# Patient Record
Sex: FEMALE | Race: WHITE | NOT HISPANIC OR LATINO | Employment: FULL TIME | URBAN - NONMETROPOLITAN AREA
[De-identification: names, ages, dates, MRNs, and addresses within clinical notes are randomized per-mention and may not be internally consistent; named-entity substitution may affect disease eponyms.]

---

## 2017-01-13 ENCOUNTER — TRANSCRIBE ORDERS (OUTPATIENT)
Dept: INFUSION THERAPY | Facility: HOSPITAL | Age: 32
End: 2017-01-13

## 2017-01-13 DIAGNOSIS — D50.9 IRON DEFICIENCY ANEMIA, UNSPECIFIED: ICD-10-CM

## 2017-01-13 DIAGNOSIS — K90.9 UNSPECIFIED INTESTINAL MALABSORPTION: Primary | ICD-10-CM

## 2017-01-16 ENCOUNTER — HOSPITAL ENCOUNTER (OUTPATIENT)
Dept: INFUSION THERAPY | Facility: HOSPITAL | Age: 32
Setting detail: INFUSION SERIES
Discharge: HOME | End: 2017-01-16
Attending: INTERNAL MEDICINE

## 2017-01-16 VITALS
HEART RATE: 81 BPM | SYSTOLIC BLOOD PRESSURE: 107 MMHG | DIASTOLIC BLOOD PRESSURE: 66 MMHG | RESPIRATION RATE: 18 BRPM | TEMPERATURE: 97.8 F

## 2017-01-16 DIAGNOSIS — K90.9 UNSPECIFIED INTESTINAL MALABSORPTION: ICD-10-CM

## 2017-01-16 DIAGNOSIS — D50.9 IRON DEFICIENCY ANEMIA, UNSPECIFIED: ICD-10-CM

## 2017-01-16 PROCEDURE — 25010000002 FERUMOXYTOL 510 MG/17ML SOLUTION 510 MG VIAL: Performed by: INTERNAL MEDICINE

## 2017-01-16 PROCEDURE — 96374 THER/PROPH/DIAG INJ IV PUSH: CPT

## 2017-01-16 RX ORDER — DICYCLOMINE HYDROCHLORIDE 10 MG/1
10 CAPSULE ORAL AS NEEDED
COMMUNITY

## 2017-01-16 RX ORDER — FLUTICASONE PROPIONATE 50 MCG
2 SPRAY, SUSPENSION (ML) NASAL DAILY
COMMUNITY

## 2017-01-16 RX ORDER — UBIDECARENONE 75 MG
250 CAPSULE ORAL DAILY
COMMUNITY

## 2017-01-16 RX ADMIN — FERUMOXYTOL 510 MG: 510 INJECTION INTRAVENOUS at 14:32

## 2017-01-16 NOTE — MR AVS SNAPSHOT
[facility] OUTPATIENT INFUSION NON ONCOLOGY  [phone]                    Gabrielle Lopez   1/16/2017  2:00 PM   INFUSION    Provider:  BED 3 [facility]   Department:  [facility] OUTPATIENT INFUSION NON ONCOLOGY   Dept Phone:  [phone]                Your Full Care Plan           Instructions    Ferumoxytol injection  What is this medicine?  FERUMOXYTOL is an iron complex. Iron is used to make healthy red blood cells, which carry oxygen and nutrients throughout the body. This medicine is used to treat iron deficiency anemia in people with chronic kidney disease.  This medicine may be used for other purposes; ask your health care provider or pharmacist if you have questions.  What should I tell my health care provider before I take this medicine?  They need to know if you have any of these conditions:  -anemia not caused by low iron levels  -high levels of iron in the blood  -magnetic resonance imaging (MRI) test scheduled  -an unusual or allergic reaction to iron, other medicines, foods, dyes, or preservatives  -pregnant or trying to get pregnant  -breast-feeding  How should I use this medicine?  This medicine is for injection into a vein. It is given by a health care professional in a hospital or clinic setting.  Talk to your pediatrician regarding the use of this medicine in children. Special care may be needed.  Overdosage: If you think you have taken too much of this medicine contact a poison control center or emergency room at once.  NOTE: This medicine is only for you. Do not share this medicine with others.  What if I miss a dose?  It is important not to miss your dose. Call your doctor or health care professional if you are unable to keep an appointment.  What may interact with this medicine?  This medicine may interact with the following medications:  -other iron products  This list may not describe all possible interactions. Give your health care provider a list of all the  medicines, herbs, non-prescription drugs, or dietary supplements you use. Also tell them if you smoke, drink alcohol, or use illegal drugs. Some items may interact with your medicine.  What should I watch for while using this medicine?  Visit your doctor or healthcare professional regularly. Tell your doctor or healthcare professional if your symptoms do not start to get better or if they get worse. You may need blood work done while you are taking this medicine.  You may need to follow a special diet. Talk to your doctor. Foods that contain iron include: whole grains/cereals, dried fruits, beans, or peas, leafy green vegetables, and organ meats (liver, kidney).  What side effects may I notice from receiving this medicine?  Side effects that you should report to your doctor or health care professional as soon as possible:  -allergic reactions like skin rash, itching or hives, swelling of the face, lips, or tongue  -breathing problems  -changes in blood pressure  -feeling faint or lightheaded, falls  -fever or chills  -flushing, sweating, or hot feelings  -swelling of the ankles or feet  Side effects that usually do not require medical attention (Report these to your doctor or health care professional if they continue or are bothersome.):  -diarrhea  -headache  -nausea, vomiting  -stomach pain  This list may not describe all possible side effects. Call your doctor for medical advice about side effects. You may report side effects to FDA at 3-979-FDA-1154.  Where should I keep my medicine?  This drug is given in a hospital or clinic and will not be stored at home.  NOTE: This sheet is a summary. It may not cover all possible information. If you have questions about this medicine, talk to your doctor, pharmacist, or health care provider.     © 2016, Elsevier/Gold Standard. (2013-08-02 15:23:36)         To Do List     1/23/2017 5:00 PM     Appointment with CHAIR 3 COR ACU at [facility] OUTPATIENT INFUSION NON  ONCOLOGY ([phone])   [address] [id number]            Your Updated Medication List      ASK your doctor about these medications     dicyclomine 10 MG capsule   Commonly known as:  BENTYL       fluticasone 50 MCG/ACT nasal spray   Commonly known as:  FLONASE       linaclotide 290 MCG capsule capsule   Commonly known as:  LINZESS       vitamin B-12 100 MCG tablet   Commonly known as:  CYANOCOBALAMIN               [employer] Signup     [facility] [employer] allows you to send messages to your doctor, view your test results, renew your prescriptions, schedule appointments, and more. To sign up, go to [employer] and click on the Sign Up Now link in the New User? box. Enter your [employer] Activation Code exactly as it appears below along with the last four digits of your Social Security Number and your Date of Birth () to complete the sign-up process. If you do not sign up before the expiration date, you must request a new code.    [employer] Activation Code: [email or web address]  Expires: 2017  3:26 PM    If you have questions, you can email [email or web address] or call [phone] to talk to our [employer] staff. Remember, [employer] is NOT to be used for urgent needs. For medical emergencies, dial 911.               Other Info from Your Visit           Allergies     No Known Allergies      Reason for Visit     IV Medication           Vital Signs     Blood Pressure Pulse Temperature Respirations Smoking Status       107/66 (BP Location: Left arm, Patient Position: Sitting) 81 97.8 °F (36.6 °C) (Oral) 18 Never Smoker       Medications Administered     ferumoxytol (FERAHEME) 510 mg in sodium chloride 0.9 % 117 mL IVPB                    Discharge Instructions     None         SYMPTOMS OF A STROKE    Call 911 or have someone take you to the Emergency Department if you have any of the following:    · Sudden numbness or weakness of your face, arm or leg especially on one side of the  body  · Sudden confusion, diffiiculty speaking or trouble understanding   · Changes in your vision or loss of sight in one eye  · Sudden severe headache with no known cause  · sudden dizziness, trouble walking, loss of balance or coordination    It is important to seek emergency care right away if you have further stroke symptoms. If you get emergency help quickly, the powerful clot-dissolving medicines can reduce the disabilities caused by a stroke.     For more information:    American Stroke Association  6-533-4-STROKE  www.strokeassociation.org           IF YOU SMOKE OR USE TOBACCO PLEASE READ THE FOLLOWING:    Why is smoking bad for me?  Smoking increases the risk of heart disease, lung disease, vascular disease, stroke, and cancer.     If you smoke, STOP!    If you would like more information on quitting smoking, please visit the [facility] website: [email or web address]   This link will provide additional resources including the QUIT line and the Beat the Pack support groups.     For more information:    American Cancer Society  [phone]    American Heart Association  [phone]

## 2017-01-16 NOTE — PATIENT INSTRUCTIONS
Ferumoxytol injection  What is this medicine?  FERUMOXYTOL is an iron complex. Iron is used to make healthy red blood cells, which carry oxygen and nutrients throughout the body. This medicine is used to treat iron deficiency anemia in people with chronic kidney disease.  This medicine may be used for other purposes; ask your health care provider or pharmacist if you have questions.  What should I tell my health care provider before I take this medicine?  They need to know if you have any of these conditions:  -anemia not caused by low iron levels  -high levels of iron in the blood  -magnetic resonance imaging (MRI) test scheduled  -an unusual or allergic reaction to iron, other medicines, foods, dyes, or preservatives  -pregnant or trying to get pregnant  -breast-feeding  How should I use this medicine?  This medicine is for injection into a vein. It is given by a health care professional in a hospital or clinic setting.  Talk to your pediatrician regarding the use of this medicine in children. Special care may be needed.  Overdosage: If you think you have taken too much of this medicine contact a poison control center or emergency room at once.  NOTE: This medicine is only for you. Do not share this medicine with others.  What if I miss a dose?  It is important not to miss your dose. Call your doctor or health care professional if you are unable to keep an appointment.  What may interact with this medicine?  This medicine may interact with the following medications:  -other iron products  This list may not describe all possible interactions. Give your health care provider a list of all the medicines, herbs, non-prescription drugs, or dietary supplements you use. Also tell them if you smoke, drink alcohol, or use illegal drugs. Some items may interact with your medicine.  What should I watch for while using this medicine?  Visit your doctor or healthcare professional regularly. Tell your doctor or healthcare  professional if your symptoms do not start to get better or if they get worse. You may need blood work done while you are taking this medicine.  You may need to follow a special diet. Talk to your doctor. Foods that contain iron include: whole grains/cereals, dried fruits, beans, or peas, leafy green vegetables, and organ meats (liver, kidney).  What side effects may I notice from receiving this medicine?  Side effects that you should report to your doctor or health care professional as soon as possible:  -allergic reactions like skin rash, itching or hives, swelling of the face, lips, or tongue  -breathing problems  -changes in blood pressure  -feeling faint or lightheaded, falls  -fever or chills  -flushing, sweating, or hot feelings  -swelling of the ankles or feet  Side effects that usually do not require medical attention (Report these to your doctor or health care professional if they continue or are bothersome.):  -diarrhea  -headache  -nausea, vomiting  -stomach pain  This list may not describe all possible side effects. Call your doctor for medical advice about side effects. You may report side effects to FDA at 0-942-FDA-8019.  Where should I keep my medicine?  This drug is given in a hospital or clinic and will not be stored at home.  NOTE: This sheet is a summary. It may not cover all possible information. If you have questions about this medicine, talk to your doctor, pharmacist, or health care provider.     © 2016, Elsevier/Gold Standard. (2013-08-02 15:23:36)

## 2017-01-16 NOTE — IP AVS SNAPSHOT
[facility] OUTPATIENT INFUSION NON ONCOLOGY  [phone]                    Gabrielle Lopez   1/16/2017  2:00 PM   INFUSION    Provider:  BED 3 [facility]   Department:  [facility] OUTPATIENT INFUSION NON ONCOLOGY   Dept Phone:  [phone]                Your Full Care Plan           Instructions    Ferumoxytol injection  What is this medicine?  FERUMOXYTOL is an iron complex. Iron is used to make healthy red blood cells, which carry oxygen and nutrients throughout the body. This medicine is used to treat iron deficiency anemia in people with chronic kidney disease.  This medicine may be used for other purposes; ask your health care provider or pharmacist if you have questions.  What should I tell my health care provider before I take this medicine?  They need to know if you have any of these conditions:  -anemia not caused by low iron levels  -high levels of iron in the blood  -magnetic resonance imaging (MRI) test scheduled  -an unusual or allergic reaction to iron, other medicines, foods, dyes, or preservatives  -pregnant or trying to get pregnant  -breast-feeding  How should I use this medicine?  This medicine is for injection into a vein. It is given by a health care professional in a hospital or clinic setting.  Talk to your pediatrician regarding the use of this medicine in children. Special care may be needed.  Overdosage: If you think you have taken too much of this medicine contact a poison control center or emergency room at once.  NOTE: This medicine is only for you. Do not share this medicine with others.  What if I miss a dose?  It is important not to miss your dose. Call your doctor or health care professional if you are unable to keep an appointment.  What may interact with this medicine?  This medicine may interact with the following medications:  -other iron products  This list may not describe all possible interactions. Give your health care provider a list of all the  medicines, herbs, non-prescription drugs, or dietary supplements you use. Also tell them if you smoke, drink alcohol, or use illegal drugs. Some items may interact with your medicine.  What should I watch for while using this medicine?  Visit your doctor or healthcare professional regularly. Tell your doctor or healthcare professional if your symptoms do not start to get better or if they get worse. You may need blood work done while you are taking this medicine.  You may need to follow a special diet. Talk to your doctor. Foods that contain iron include: whole grains/cereals, dried fruits, beans, or peas, leafy green vegetables, and organ meats (liver, kidney).  What side effects may I notice from receiving this medicine?  Side effects that you should report to your doctor or health care professional as soon as possible:  -allergic reactions like skin rash, itching or hives, swelling of the face, lips, or tongue  -breathing problems  -changes in blood pressure  -feeling faint or lightheaded, falls  -fever or chills  -flushing, sweating, or hot feelings  -swelling of the ankles or feet  Side effects that usually do not require medical attention (Report these to your doctor or health care professional if they continue or are bothersome.):  -diarrhea  -headache  -nausea, vomiting  -stomach pain  This list may not describe all possible side effects. Call your doctor for medical advice about side effects. You may report side effects to FDA at 7-599-FDA-0357.  Where should I keep my medicine?  This drug is given in a hospital or clinic and will not be stored at home.  NOTE: This sheet is a summary. It may not cover all possible information. If you have questions about this medicine, talk to your doctor, pharmacist, or health care provider.     © 2016, Elsevier/Gold Standard. (2013-08-02 15:23:36)         To Do List     1/23/2017 5:00 PM     Appointment with CHAIR 3 COR ACU at [facility] OUTPATIENT INFUSION NON  ONCOLOGY ([phone])   [address] [phone]            Your Updated Medication List      ASK your doctor about these medications     dicyclomine 10 MG capsule   Commonly known as:  BENTYL       fluticasone 50 MCG/ACT nasal spray   Commonly known as:  FLONASE       linaclotide 290 MCG capsule capsule   Commonly known as:  LINZESS       vitamin B-12 100 MCG tablet   Commonly known as:  CYANOCOBALAMIN               [employer] Signup     [facility] [employer] allows you to send messages to your doctor, view your test results, renew your prescriptions, schedule appointments, and more. To sign up, go to [employer] and click on the Sign Up Now link in the New User? box. Enter your [employer] Activation Code exactly as it appears below along with the last four digits of your Social Security Number and your Date of Birth () to complete the sign-up process. If you do not sign up before the expiration date, you must request a new code.    [employer] Activation Code: [email or web address]  Expires: 2017  3:26 PM    If you have questions, you can email [email or web address] or call [phone] to talk to our [employer] staff. Remember, [employer] is NOT to be used for urgent needs. For medical emergencies, dial 911.               Other Info from Your Visit           Allergies     No Known Allergies      Vital Signs     Smoking Status                   Never Smoker           Medications Administered     ferumoxytol (FERAHEME) 510 mg in sodium chloride 0.9 % 117 mL IVPB                    Discharge Instructions     None         SYMPTOMS OF A STROKE    Call 911 or have someone take you to the Emergency Department if you have any of the following:    · Sudden numbness or weakness of your face, arm or leg especially on one side of the body  · Sudden confusion, diffiiculty speaking or trouble understanding   · Changes in your vision or loss of sight in one eye  · Sudden severe headache with no known  cause  · sudden dizziness, trouble walking, loss of balance or coordination    It is important to seek emergency care right away if you have further stroke symptoms. If you get emergency help quickly, the powerful clot-dissolving medicines can reduce the disabilities caused by a stroke.     For more information:    American Stroke Association  7-279-2-STROKE  www.strokeassociation.org           IF YOU SMOKE OR USE TOBACCO PLEASE READ THE FOLLOWING:    Why is smoking bad for me?  Smoking increases the risk of heart disease, lung disease, vascular disease, stroke, and cancer.     If you smoke, STOP!    If you would like more information on quitting smoking, please visit the [employer] website: [email or web address]   This link will provide additional resources including the QUIT line and the Beat the Pack support groups.     For more information:    American Cancer Society  [phone]    American Heart Association  8-120-539-8037

## 2017-01-23 ENCOUNTER — HOSPITAL ENCOUNTER (OUTPATIENT)
Dept: INFUSION THERAPY | Facility: HOSPITAL | Age: 32
Setting detail: INFUSION SERIES
Discharge: HOME | End: 2017-01-23
Attending: INTERNAL MEDICINE

## 2017-01-23 VITALS
RESPIRATION RATE: 18 BRPM | HEART RATE: 74 BPM | SYSTOLIC BLOOD PRESSURE: 108 MMHG | DIASTOLIC BLOOD PRESSURE: 70 MMHG | TEMPERATURE: 97.5 F

## 2017-01-23 DIAGNOSIS — D50.9 IRON DEFICIENCY ANEMIA, UNSPECIFIED: ICD-10-CM

## 2017-01-23 DIAGNOSIS — K90.9 UNSPECIFIED INTESTINAL MALABSORPTION: ICD-10-CM

## 2017-01-23 PROCEDURE — 96374 THER/PROPH/DIAG INJ IV PUSH: CPT

## 2017-01-23 PROCEDURE — 25010000002 FERUMOXYTOL 510 MG/17ML SOLUTION 510 MG VIAL: Performed by: INTERNAL MEDICINE

## 2017-01-23 RX ADMIN — FERUMOXYTOL 510 MG: 510 INJECTION INTRAVENOUS at 17:34

## 2020-06-25 ENCOUNTER — TRANSCRIBE ORDERS (OUTPATIENT)
Dept: INFUSION THERAPY | Facility: HOSPITAL | Age: 35
End: 2020-06-25

## 2020-06-25 DIAGNOSIS — K90.9 INTESTINAL MALABSORPTION, UNSPECIFIED TYPE: ICD-10-CM

## 2020-06-25 DIAGNOSIS — D50.9 IRON DEFICIENCY ANEMIA, UNSPECIFIED IRON DEFICIENCY ANEMIA TYPE: Primary | ICD-10-CM

## 2020-06-29 ENCOUNTER — APPOINTMENT (OUTPATIENT)
Dept: INFUSION THERAPY | Facility: HOSPITAL | Age: 35
End: 2020-06-29

## 2020-06-30 ENCOUNTER — HOSPITAL ENCOUNTER (OUTPATIENT)
Dept: INFUSION THERAPY | Facility: HOSPITAL | Age: 35
Setting detail: INFUSION SERIES
Discharge: HOME | End: 2020-06-30

## 2020-06-30 VITALS
SYSTOLIC BLOOD PRESSURE: 99 MMHG | DIASTOLIC BLOOD PRESSURE: 63 MMHG | HEART RATE: 71 BPM | RESPIRATION RATE: 20 BRPM | TEMPERATURE: 97.9 F

## 2020-06-30 DIAGNOSIS — K90.9 INTESTINAL MALABSORPTION, UNSPECIFIED TYPE: Primary | ICD-10-CM

## 2020-06-30 DIAGNOSIS — D50.9 IRON DEFICIENCY ANEMIA, UNSPECIFIED IRON DEFICIENCY ANEMIA TYPE: ICD-10-CM

## 2020-06-30 PROCEDURE — 25010000002 FERRIC CARBOXYMALTOSE 750 MG/15ML SOLUTION 15 ML VIAL: Performed by: INTERNAL MEDICINE

## 2020-06-30 PROCEDURE — 96365 THER/PROPH/DIAG IV INF INIT: CPT

## 2020-06-30 RX ORDER — MONTELUKAST SODIUM 10 MG/1
10 TABLET ORAL NIGHTLY
COMMUNITY

## 2020-06-30 RX ORDER — LORATADINE 10 MG/1
CAPSULE, LIQUID FILLED ORAL
COMMUNITY

## 2020-06-30 RX ORDER — FAMOTIDINE 20 MG/1
20 TABLET, FILM COATED ORAL 2 TIMES DAILY
COMMUNITY

## 2020-06-30 RX ADMIN — FERRIC CARBOXYMALTOSE INJECTION 750 MG: 50 INJECTION, SOLUTION INTRAVENOUS at 14:40

## 2020-07-07 ENCOUNTER — HOSPITAL ENCOUNTER (OUTPATIENT)
Dept: INFUSION THERAPY | Facility: HOSPITAL | Age: 35
Setting detail: INFUSION SERIES
Discharge: HOME | End: 2020-07-07

## 2020-07-07 VITALS
TEMPERATURE: 98.6 F | DIASTOLIC BLOOD PRESSURE: 71 MMHG | BODY MASS INDEX: 31.32 KG/M2 | RESPIRATION RATE: 20 BRPM | HEIGHT: 65 IN | SYSTOLIC BLOOD PRESSURE: 108 MMHG | HEART RATE: 78 BPM | WEIGHT: 188 LBS

## 2020-07-07 DIAGNOSIS — K90.9 INTESTINAL MALABSORPTION, UNSPECIFIED TYPE: Primary | ICD-10-CM

## 2020-07-07 DIAGNOSIS — D50.9 IRON DEFICIENCY ANEMIA, UNSPECIFIED IRON DEFICIENCY ANEMIA TYPE: ICD-10-CM

## 2020-07-07 PROCEDURE — 96365 THER/PROPH/DIAG IV INF INIT: CPT

## 2020-07-07 PROCEDURE — 25010000002 FERRIC CARBOXYMALTOSE 750 MG/15ML SOLUTION 15 ML VIAL: Performed by: INTERNAL MEDICINE

## 2020-07-07 RX ADMIN — FERRIC CARBOXYMALTOSE INJECTION 750 MG: 50 INJECTION, SOLUTION INTRAVENOUS at 14:26

## 2020-07-07 NOTE — PATIENT INSTRUCTIONS
Ferric carboxymaltose injection  What is this medicine?  FERRIC CARBOXYMALTOSE (ferr-ik car-box-ee-mol-toes) is an iron complex. Iron is used to make healthy red blood cells, which carry oxygen and nutrients throughout the body. This medicine is used to treat anemia in people with chronic kidney disease or people who cannot take iron by mouth.  This medicine may be used for other purposes; ask your health care provider or pharmacist if you have questions.  COMMON BRAND NAME(S): Injectafer  What should I tell my health care provider before I take this medicine?  They need to know if you have any of these conditions:  · high levels of iron in the blood  · liver disease  · an unusual or allergic reaction to iron, other medicines, foods, dyes, or preservatives  · pregnant or trying to get pregnant  · breast-feeding  How should I use this medicine?  This medicine is for infusion into a vein. It is given by a health care professional in a hospital or clinic setting.  Talk to your pediatrician regarding the use of this medicine in children. Special care may be needed.  Overdosage: If you think you have taken too much of this medicine contact a poison control center or emergency room at once.  NOTE: This medicine is only for you. Do not share this medicine with others.  What if I miss a dose?  It is important not to miss your dose. Call your doctor or health care professional if you are unable to keep an appointment.  What may interact with this medicine?  Do not take this medicine with any of the following medications:  · deferoxamine  · dimercaprol  · other iron products  This list may not describe all possible interactions. Give your health care provider a list of all the medicines, herbs, non-prescription drugs, or dietary supplements you use. Also tell them if you smoke, drink alcohol, or use illegal drugs. Some items may interact with your medicine.  What should I watch for while using this medicine?  Visit your  doctor or health care professional regularly. Tell your doctor if your symptoms do not start to get better or if they get worse. You may need blood work done while you are taking this medicine.  You may need to follow a special diet. Talk to your doctor. Foods that contain iron include: whole grains/cereals, dried fruits, beans, or peas, leafy green vegetables, and organ meats (liver, kidney).  What side effects may I notice from receiving this medicine?  Side effects that you should report to your doctor or health care professional as soon as possible:  · allergic reactions like skin rash, itching or hives, swelling of the face, lips, or tongue  · dizziness  · facial flushing  Side effects that usually do not require medical attention (report to your doctor or health care professional if they continue or are bothersome):  · changes in taste  · constipation  · headache  · nausea, vomiting  · pain, redness, or irritation at site where injected  This list may not describe all possible side effects. Call your doctor for medical advice about side effects. You may report side effects to FDA at 9-496-FDA-5009.  Where should I keep my medicine?  This drug is given in a hospital or clinic and will not be stored at home.  NOTE: This sheet is a summary. It may not cover all possible information. If you have questions about this medicine, talk to your doctor, pharmacist, or health care provider.  © 2020 Elsevier/Gold Standard (2018-02-01 09:40:29)

## 2021-03-18 ENCOUNTER — BULK ORDERING (OUTPATIENT)
Dept: CASE MANAGEMENT | Facility: OTHER | Age: 36
End: 2021-03-18

## 2021-03-18 DIAGNOSIS — Z23 IMMUNIZATION DUE: ICD-10-CM

## 2021-05-06 ENCOUNTER — TRANSCRIBE ORDERS (OUTPATIENT)
Dept: ADMINISTRATIVE | Facility: HOSPITAL | Age: 36
End: 2021-05-06

## 2021-05-06 DIAGNOSIS — Z11.59 SPECIAL SCREENING EXAMINATION FOR VIRAL DISEASE: Primary | ICD-10-CM

## 2022-09-19 ENCOUNTER — TRANSCRIBE ORDERS (OUTPATIENT)
Dept: ADMINISTRATIVE | Facility: HOSPITAL | Age: 37
End: 2022-09-19

## 2022-09-19 DIAGNOSIS — R13.10 DYSPHAGIA, UNSPECIFIED TYPE: Primary | ICD-10-CM

## 2022-09-21 ENCOUNTER — APPOINTMENT (OUTPATIENT)
Dept: GENERAL RADIOLOGY | Facility: HOSPITAL | Age: 37
End: 2022-09-21

## 2022-09-22 ENCOUNTER — HOSPITAL ENCOUNTER (OUTPATIENT)
Dept: GENERAL RADIOLOGY | Facility: HOSPITAL | Age: 37
Discharge: HOME | End: 2022-09-22
Admitting: INTERNAL MEDICINE

## 2022-09-22 DIAGNOSIS — R13.10 DYSPHAGIA, UNSPECIFIED TYPE: ICD-10-CM

## 2022-09-22 PROCEDURE — 74220 X-RAY XM ESOPHAGUS 1CNTRST: CPT

## 2022-09-22 PROCEDURE — 74220 X-RAY XM ESOPHAGUS 1CNTRST: CPT | Performed by: RADIOLOGY

## 2024-01-22 ENCOUNTER — TRANSCRIBE ORDERS (OUTPATIENT)
Dept: ADMINISTRATIVE | Facility: HOSPITAL | Age: 39
End: 2024-01-22
Payer: COMMERCIAL

## 2024-01-22 DIAGNOSIS — Z12.31 VISIT FOR SCREENING MAMMOGRAM: Primary | ICD-10-CM

## 2024-01-31 ENCOUNTER — HOSPITAL ENCOUNTER (OUTPATIENT)
Dept: MAMMOGRAPHY | Facility: HOSPITAL | Age: 39
Discharge: HOME | End: 2024-01-31
Admitting: NURSE PRACTITIONER
Payer: COMMERCIAL

## 2024-01-31 DIAGNOSIS — Z12.31 VISIT FOR SCREENING MAMMOGRAM: ICD-10-CM

## 2024-01-31 PROCEDURE — 77063 BREAST TOMOSYNTHESIS BI: CPT

## 2024-01-31 PROCEDURE — 77067 SCR MAMMO BI INCL CAD: CPT

## 2024-02-01 PROCEDURE — 77063 BREAST TOMOSYNTHESIS BI: CPT | Performed by: RADIOLOGY

## 2024-02-01 PROCEDURE — 77067 SCR MAMMO BI INCL CAD: CPT | Performed by: RADIOLOGY

## 2024-05-31 ENCOUNTER — HOSPITAL ENCOUNTER (OUTPATIENT)
Dept: GENERAL RADIOLOGY | Facility: HOSPITAL | Age: 39
Discharge: HOME | End: 2024-05-31
Payer: COMMERCIAL

## 2024-05-31 ENCOUNTER — OFFICE VISIT (OUTPATIENT)
Dept: UROLOGY | Facility: CLINIC | Age: 39
End: 2024-05-31
Payer: COMMERCIAL

## 2024-05-31 VITALS
HEART RATE: 80 BPM | HEIGHT: 65 IN | DIASTOLIC BLOOD PRESSURE: 80 MMHG | BODY MASS INDEX: 32.96 KG/M2 | WEIGHT: 197.8 LBS | SYSTOLIC BLOOD PRESSURE: 115 MMHG

## 2024-05-31 DIAGNOSIS — R35.0 FREQUENCY OF MICTURITION: ICD-10-CM

## 2024-05-31 DIAGNOSIS — N20.0 RIGHT NEPHROLITHIASIS: Primary | ICD-10-CM

## 2024-05-31 DIAGNOSIS — N32.81 OVERACTIVE BLADDER: ICD-10-CM

## 2024-05-31 LAB
BILIRUB BLD-MCNC: NEGATIVE MG/DL
CLARITY, POC: CLEAR
COLOR UR: YELLOW
EXPIRATION DATE: ABNORMAL
GLUCOSE UR STRIP-MCNC: NEGATIVE MG/DL
KETONES UR QL: ABNORMAL
LEUKOCYTE EST, POC: NEGATIVE
Lab: ABNORMAL
NITRITE UR-MCNC: NEGATIVE MG/ML
PH UR: 6 [PH] (ref 5–8)
PROT UR STRIP-MCNC: NEGATIVE MG/DL
RBC # UR STRIP: NEGATIVE /UL
SP GR UR: 1.02 (ref 1–1.03)
UROBILINOGEN UR QL: NORMAL

## 2024-05-31 PROCEDURE — 87086 URINE CULTURE/COLONY COUNT: CPT

## 2024-05-31 PROCEDURE — 74018 RADEX ABDOMEN 1 VIEW: CPT

## 2024-05-31 RX ORDER — THIAMINE HCL 100 MG
TABLET ORAL
COMMUNITY

## 2024-05-31 RX ORDER — FAMOTIDINE 40 MG/1
1 TABLET, FILM COATED ORAL DAILY
COMMUNITY

## 2024-05-31 RX ORDER — TRIAMCINOLONE ACETONIDE 55 UG/1
SPRAY, METERED NASAL
COMMUNITY

## 2024-05-31 RX ORDER — TAMSULOSIN HYDROCHLORIDE 0.4 MG/1
1 CAPSULE ORAL DAILY
Qty: 30 CAPSULE | Refills: 0 | Status: SHIPPED | OUTPATIENT
Start: 2024-05-31

## 2024-05-31 RX ORDER — BACLOFEN 500 UG/ML
INJECTION, SOLUTION INTRATHECAL
COMMUNITY

## 2024-05-31 RX ORDER — TROSPIUM CHLORIDE 20 MG/1
20 TABLET, FILM COATED ORAL 2 TIMES DAILY
COMMUNITY

## 2024-05-31 RX ORDER — MELATONIN 10 MG
CAPSULE ORAL
COMMUNITY

## 2024-05-31 NOTE — PROGRESS NOTES
"Chief Complaint:    Chief Complaint   Patient presents with    over active bladder     Nephrolithiasis       Vital Signs:   /80   Pulse 80   Ht 165.1 cm (65\")   Wt 89.7 kg (197 lb 12.8 oz)   BMI 32.92 kg/m²   Body mass index is 32.92 kg/m².      HPI:  Gabrielle Lopez is a 38 y.o. female who presents today for initial evaluation     History of Present Illness  Ms. Lopez presents to the clinic for evaluation of nephrolithiasis.  She has been referred to us by Dr. Francisco J Kang DO.  He has a past medical history significant for irritable bowel syndrome, overactive bladder for which she is currently been on trospium 20 mg twice daily, and right obstructive uropathy.  She had a CT scan taken of the abdomen pelvis in August 2023 that showed an 8 x 5 proximal to mid right ureteral calculus causing moderate obstructive uropathy.  Followed up with Dr. Francsico J Kang in office and was scheduled to undergo a right uteroscopy with holmium laser lithotripsy and stent insertion on 9/5/2023.  She followed up with Dr. Kang in clinic and had a cystoscopy with stent removal at that time.  She then had repeat renal ultrasound completed in December 2023 that showed a possible 18 x 15 mm right intrarenal calculi.  No obstructive uropathy was identified on ultrasound.  She went back to Dr. Kang for further evaluation and then proceeded forward with a CT scan of the abdomen pelvis that showed a right intrarenal calculi.  She states she followed up with Dr. Armendariz several times who advised her this was not a kidney stone but he was unsure what the calcification was in her kidney.  She did have a most recent CT scan of the abdomen pelvis completed during the month of May but I am unable to obtain this report.  She does endorse frequency, urgency, and some intermittent back or flank pain.  She denies any significant gross hematuria or severe pain.  I did complete a KUB in office today that revealed a small roughly 4 to 5 mm right renal " calcification most consistent with a kidney stone.  Left kidney could not not be well-seen due to large amount of stool overlying this.  Urine in office today showed trace ketones only with no leukocytes, nitrites, or microscopic/gross blood.      Past Medical History:  Past Medical History:   Diagnosis Date    Anemia     Irritable bowel     Kidney stone     Urinary tract infection        Current Meds:  Current Outpatient Medications   Medication Sig Dispense Refill    aspirin 81 MG oral suspension       baclofen (LIORESAL) 10 MG/20ML injection       dicyclomine (BENTYL) 10 MG capsule Take 1 capsule by mouth As Needed.      famotidine (PEPCID) 40 MG tablet Take 1 tablet by mouth Daily.      Loratadine 10 MG capsule Take  by mouth.      Magnesium 500 MG tablet       Melatonin 10 MG capsule       montelukast (SINGULAIR) 10 MG tablet Take 1 tablet by mouth Every Night.      Triamcinolone Acetonide (Nasacort Allergy 24HR) 55 MCG/ACT nasal inhaler       trospium (SANCTURA) 20 MG tablet Take 1 tablet by mouth 2 (Two) Times a Day.      vitamin B-12 (CYANOCOBALAMIN) 100 MCG tablet Take 2.5 tablets by mouth Daily.      Mirabegron ER (MYRBETRIQ) 25 MG tablet sustained-release 24 hour 24 hr tablet Take 1 tablet by mouth Daily. 30 tablet 1    tamsulosin (FLOMAX) 0.4 MG capsule 24 hr capsule Take 1 capsule by mouth Daily. 30 capsule 0     No current facility-administered medications for this visit.        Allergies:   Allergies   Allergen Reactions    Peanut Oil Nausea And Vomiting    Shellfish-Derived Products Hives    Wheat Nausea And Vomiting        Past Surgical History:  Past Surgical History:   Procedure Laterality Date     SECTION      CHOLECYSTECTOMY      EXTERNAL EAR SURGERY      closed ear ducts - both ears    HYSTERECTOMY      KIDNEY STONE SURGERY  23    LAPAROSCOPY DIAGNOSTIC / BIOPSY / ASPIRATION / LYSIS      LITHOTRIPSY  23       Social History:  Social History     Socioeconomic History     Marital status:    Tobacco Use    Smoking status: Never     Passive exposure: Never    Smokeless tobacco: Never   Vaping Use    Vaping status: Never Used   Substance and Sexual Activity    Alcohol use: No    Drug use: Never    Sexual activity: Yes     Partners: Male     Birth control/protection: Hysterectomy       Family History:  Family History   Problem Relation Age of Onset    Breast cancer Neg Hx        Review of Systems:  Review of Systems   Constitutional:  Negative for fatigue, fever and unexpected weight change.   Respiratory:  Negative for chest tightness and shortness of breath.    Cardiovascular:  Negative for chest pain.   Gastrointestinal:  Negative for abdominal pain, constipation, diarrhea, nausea and vomiting.   Genitourinary:  Positive for flank pain, frequency and urgency. Negative for difficulty urinating and dysuria.   Musculoskeletal:  Positive for back pain.   Skin:  Negative for rash.   Psychiatric/Behavioral:  Negative for confusion and suicidal ideas.        Physical Exam:  Physical Exam  Constitutional:       General: She is not in acute distress.     Appearance: Normal appearance.   HENT:      Head: Normocephalic and atraumatic.      Nose: Nose normal.      Mouth/Throat:      Mouth: Mucous membranes are moist.   Eyes:      Conjunctiva/sclera: Conjunctivae normal.   Cardiovascular:      Rate and Rhythm: Normal rate and regular rhythm.      Pulses: Normal pulses.      Heart sounds: Normal heart sounds.   Pulmonary:      Effort: Pulmonary effort is normal.      Breath sounds: Normal breath sounds.   Abdominal:      General: Bowel sounds are normal.      Palpations: Abdomen is soft.      Tenderness: There is no right CVA tenderness or left CVA tenderness.   Musculoskeletal:         General: Normal range of motion.      Cervical back: Normal range of motion.   Skin:     General: Skin is warm.   Neurological:      General: No focal deficit present.      Mental Status: She is alert and  oriented to person, place, and time.   Psychiatric:         Mood and Affect: Mood normal.         Behavior: Behavior normal.         Thought Content: Thought content normal.         Judgment: Judgment normal.             Recent Image (CT and/or KUB):   CT Abdomen and Pelvis: No results found for this or any previous visit.     CT Stone Protocol: No results found for this or any previous visit.     KUB: No results found for this or any previous visit.       Labs:  Brief Urine Lab Results  (Last result in the past 365 days)        Color   Clarity   Blood   Leuk Est   Nitrite   Protein   CREAT   Urine HCG        05/31/24 1458 Yellow   Clear   Negative   Negative   Negative   Negative                 Office Visit on 05/31/2024   Component Date Value Ref Range Status    Color 05/31/2024 Yellow  Yellow, Straw, Dark Yellow, Jodie Final    Clarity, UA 05/31/2024 Clear  Clear Final    Specific Gravity  05/31/2024 1.025  1.005 - 1.030 Final    pH, Urine 05/31/2024 6.0  5.0 - 8.0 Final    Leukocytes 05/31/2024 Negative  Negative Final    Nitrite, UA 05/31/2024 Negative  Negative Final    Protein, POC 05/31/2024 Negative  Negative mg/dL Final    Glucose, UA 05/31/2024 Negative  Negative mg/dL Final    Ketones, UA 05/31/2024 Trace (A)  Negative Final    Urobilinogen, UA 05/31/2024 Normal  Normal, 0.2 E.U./dL Final    Bilirubin 05/31/2024 Negative  Negative Final    Blood, UA 05/31/2024 Negative  Negative Final    Lot Number 05/31/2024 [id number]   Final    Expiration Date 05/31/2024 10/25/24   Final    Urine Culture 05/31/2024 No growth   Final        Procedure: None  Procedures     I have reviewed and agree with the above PMH, PSH, FMH, social history, medications, allergies, and labs.     Assessment/Plan:   Problem List Items Addressed This Visit       Right nephrolithiasis - Primary    Relevant Medications    tamsulosin (FLOMAX) 0.4 MG capsule 24 hr capsule    Other Relevant Orders    XR abdomen kub (Completed)     Overactive bladder    Relevant Medications    trospium (SANCTURA) 20 MG tablet    tamsulosin (FLOMAX) 0.4 MG capsule 24 hr capsule    Mirabegron ER (MYRBETRIQ) 25 MG tablet sustained-release 24 hour 24 hr tablet     Other Visit Diagnoses       Frequency of micturition        Relevant Orders    POC Urinalysis Dipstick, Automated (Completed)    Urine Culture - Urine, Urine, Random Void (Completed)            Health Maintenance:   Health Maintenance Due   Topic Date Due    BMI FOLLOWUP  Never done    TDAP/TD VACCINES (1 - Tdap) Never done    HEPATITIS C SCREENING  Never done    ANNUAL PHYSICAL  Never done    PAP SMEAR  Never done    COVID-19 Vaccine (1 - 2023-24 season) Never done        Smoking Counseling: Never smoked.  Never used smokeless tobacco.  Counseling given.    Urine Incontinence: Patient reports that she is not currently experiencing any symptoms of urinary incontinence.    Patient was given instructions and counseling regarding her condition or for health maintenance advice. Please see specific information pulled into the AVS if appropriate.    Patient Education:   Renal calculus - It was discussed with the patient the presence of a calcification projecting over the right kidney most consistent with a right intrarenal stone.  Did discuss other causes which could include calcifications within the renal arteries and no significant intrarenal calculi.  Did discuss repeat CT scan if warranted but I am recommending to obtain a copy of CT report as well as disc.  We discussed the various therapeutic options available including percutaneous nephrostolithotomy, ureteroscopy and extracorporeal shockwave  lithotripsy.  We discussed the risks of lithotripsy including the passage of stones leading to a 3% chance of Steinstrasse or a large string of stones in the distal ureter. In this incidence the patient was informed that a ureteroscopy is indicated for obstructing fragments.  Patient was informed of an extremely  rare incidence of renal hematoma and the significance of this.  Patient was educated on percutaneous nephrostolithotomy and its use as well as the risks and benefits such as the need for postoperative hospitalization, and the risk of damage to the kidney and the remote risk of a nephrectomy.  We also discussed the use of ureteroscopy in the upper tracts and its decreased success rate to completely remove the stones likely causing stent placement leading to an additional procedure for removal.  We discussed the absolute relative indicators for intervention including the presence of sepsis and uncontrollable pain leading to need for urgent intervention.  We discussed placement of a stent if indicated and the management of the stent as well.  Given the patient is asymptomatic at this time recommend to continue with observation.  Did advise patient if she becomes symptomatic or stone moves further distally would conceived forward with surgical intervention as indicated.  I will send in Flomax for her to take to help with passage of kidney stone.  Advised her to take this daily as needed.  Discussed the risk and benefits of this medication in detail.  She verbalized understanding.  Overactive bladder -discussed with the patient the pathophysiology of overactive bladder.  Discussed causes which can include but not limited to bladder calculi, detrusor instability, acute urinary tract infection, nephrolithiasis, or other urological abnormalities.  Patient has been on the anticholinergics with minimal improvement.  At this time recommend a trial of Myrbetriq 25 mg once nightly.  Discussed the risk and benefits of this medication in detail with the patient.  Advised her to discontinue if she begins experiencing increasing difficulty urinating, decreased urinary output, or increase pelvic pain/pressure.  Otherwise we will follow-up with her in roughly 2 months or sooner if needed.    Visit Diagnoses:    ICD-10-CM ICD-9-CM   1.  Right nephrolithiasis  N20.0 592.0   2. Overactive bladder  N32.81 596.51   3. Frequency of micturition  R35.0 788.41       Meds Ordered During Visit:  New Medications Ordered This Visit   Medications    tamsulosin (FLOMAX) 0.4 MG capsule 24 hr capsule     Sig: Take 1 capsule by mouth Daily.     Dispense:  30 capsule     Refill:  0    Mirabegron ER (MYRBETRIQ) 25 MG tablet sustained-release 24 hour 24 hr tablet     Sig: Take 1 tablet by mouth Daily.     Dispense:  30 tablet     Refill:  1       Follow Up Appointment:  2 months or sooner if needed  No follow-ups on file.      This document has been electronically signed by Jono Solomon PA-C   June 2, 2024 15:56 EDT    Part of this note may be an electronic transcription/translation of spoken language to printed text using the Dragon Dictation System.

## 2024-06-02 PROBLEM — N20.0 RIGHT NEPHROLITHIASIS: Status: ACTIVE | Noted: 2024-06-02

## 2024-06-02 PROBLEM — N32.81 OVERACTIVE BLADDER: Status: ACTIVE | Noted: 2024-06-02

## 2024-06-02 LAB — BACTERIA SPEC AEROBE CULT: NO GROWTH

## 2024-06-02 RX ORDER — MIRABEGRON 25 MG/1
25 TABLET, FILM COATED, EXTENDED RELEASE ORAL DAILY
Qty: 30 TABLET | Refills: 1 | COMMUNITY
Start: 2024-06-02

## 2024-06-03 ENCOUNTER — TELEPHONE (OUTPATIENT)
Dept: UROLOGY | Facility: CLINIC | Age: 39
End: 2024-06-03
Payer: COMMERCIAL

## 2024-08-02 ENCOUNTER — OFFICE VISIT (OUTPATIENT)
Dept: UROLOGY | Facility: CLINIC | Age: 39
End: 2024-08-02
Payer: COMMERCIAL

## 2024-08-02 VITALS
SYSTOLIC BLOOD PRESSURE: 120 MMHG | HEIGHT: 65 IN | WEIGHT: 199.4 LBS | HEART RATE: 90 BPM | DIASTOLIC BLOOD PRESSURE: 85 MMHG | BODY MASS INDEX: 33.22 KG/M2

## 2024-08-02 DIAGNOSIS — N20.0 RIGHT NEPHROLITHIASIS: ICD-10-CM

## 2024-08-02 DIAGNOSIS — N32.81 OVERACTIVE BLADDER: Primary | ICD-10-CM

## 2024-08-02 RX ORDER — BUPROPION HYDROCHLORIDE 300 MG/1
1 TABLET ORAL DAILY
COMMUNITY
Start: 2024-07-04

## 2024-08-02 RX ORDER — MIRABEGRON 50 MG/1
50 TABLET, EXTENDED RELEASE ORAL DAILY
Qty: 90 TABLET | Refills: 3 | Status: SHIPPED | OUTPATIENT
Start: 2024-08-02

## 2024-08-02 RX ORDER — UBROGEPANT 100 MG/1
100 TABLET ORAL
COMMUNITY
Start: 2024-07-30

## 2024-08-02 RX ORDER — GALCANEZUMAB 120 MG/ML
120 INJECTION, SOLUTION SUBCUTANEOUS
COMMUNITY
Start: 2024-07-30

## 2024-08-02 RX ORDER — PRUCALOPRIDE 2 MG/1
2 TABLET, FILM COATED ORAL DAILY
COMMUNITY
Start: 2024-07-24

## 2024-08-02 RX ORDER — METRONIDAZOLE 10 MG/G
GEL TOPICAL
COMMUNITY
Start: 2024-07-30

## 2024-08-02 RX ORDER — ATOMOXETINE 40 MG/1
40 CAPSULE ORAL EVERY MORNING
COMMUNITY
Start: 2024-07-04

## 2024-08-02 NOTE — PROGRESS NOTES
"Chief Complaint:    Chief Complaint   Patient presents with    Right nephrolithiasis       Vital Signs:   /85   Pulse 90   Ht 165.1 cm (65\")   Wt 90.4 kg (199 lb 6.4 oz)   BMI 33.18 kg/m²   Body mass index is 33.18 kg/m².      HPI:  Gabrielle Lopez is a 38 y.o. female who presents today for follow up    History of Present Illness  Ms. Lopez presents to the clinic for a 2-month follow-up for right-sided nephrolithiasis and overactive bladder.  At last office visit patient was transition from trospium to Myrbetriq 25 mg once nightly.  Patient reports that she had minimal improvement in urinary symptoms and transition back to trospium.  She reports since her beginning medications she is noticed increases in frequency and urgency.  She denies any dysuria, fever, chills, or inability urinate..  Her right-sided back and flank pain is very very minimal.  She does have notable IBS.  I did suggest increasing Myrbetriq to 50 mg nightly.  Will also schedule her for urodynamic study.      Past Medical History:  Past Medical History:   Diagnosis Date    Anemia     Chronic kidney disease     Irritable bowel     Kidney stone     Urinary tract infection 2022       Current Meds:  Current Outpatient Medications   Medication Sig Dispense Refill    aspirin 81 MG oral suspension       atomoxetine (STRATTERA) 40 MG capsule Take 1 capsule by mouth Every Morning.      baclofen (LIORESAL) 10 MG/20ML injection       buPROPion XL (WELLBUTRIN XL) 300 MG 24 hr tablet Take 1 tablet by mouth Daily.      dicyclomine (BENTYL) 10 MG capsule Take 1 capsule by mouth As Needed.      Emgality 120 MG/ML auto-injector pen Inject 1 mL under the skin into the appropriate area as directed.      famotidine (PEPCID) 40 MG tablet Take 1 tablet by mouth Daily.      Loratadine 10 MG capsule Take  by mouth.      Magnesium 500 MG tablet       Melatonin 10 MG capsule       metoprolol tartrate (LOPRESSOR) 25 MG tablet Take 1 tablet by mouth 2 (Two) Times a " Day.      metroNIDAZOLE (METROGEL) 1 % gel       montelukast (SINGULAIR) 10 MG tablet Take 1 tablet by mouth Every Night.      Motegrity 2 MG tablet Take 1 tablet by mouth Daily.      tamsulosin (FLOMAX) 0.4 MG capsule 24 hr capsule Take 1 capsule by mouth Daily. 30 capsule 0    Triamcinolone Acetonide (Nasacort Allergy 24HR) 55 MCG/ACT nasal inhaler       trospium (SANCTURA) 20 MG tablet Take 1 tablet by mouth 2 (Two) Times a Day.      Ubrelvy 100 MG tablet Take 1 tablet by mouth.      vitamin B-12 (CYANOCOBALAMIN) 100 MCG tablet Take 2.5 tablets by mouth Daily.      Mirabegron ER (Myrbetriq) 50 MG tablet sustained-release 24 hour 24 hr tablet Take 50 mg by mouth Daily. 90 tablet 3     No current facility-administered medications for this visit.        Allergies:   Allergies   Allergen Reactions    Peanut Oil Nausea And Vomiting    Shellfish-Derived Products Hives    Wheat Nausea And Vomiting        Past Surgical History:  Past Surgical History:   Procedure Laterality Date     SECTION      CHOLECYSTECTOMY      EXTERNAL EAR SURGERY      closed ear ducts - both ears    HYSTERECTOMY      KIDNEY STONE SURGERY  23    LAPAROSCOPY DIAGNOSTIC / BIOPSY / ASPIRATION / LYSIS      LITHOTRIPSY  23       Social History:  Social History     Socioeconomic History    Marital status:    Tobacco Use    Smoking status: Never     Passive exposure: Never    Smokeless tobacco: Never   Vaping Use    Vaping status: Never Used   Substance and Sexual Activity    Alcohol use: No    Drug use: Never    Sexual activity: Yes     Partners: Male     Birth control/protection: Hysterectomy       Family History:  Family History   Problem Relation Age of Onset    Breast cancer Neg Hx        Review of Systems:  Review of Systems   Constitutional:  Negative for fatigue, fever and unexpected weight change.   Respiratory:  Negative for chest tightness and shortness of breath.    Cardiovascular:  Negative for chest pain.    Gastrointestinal:  Negative for abdominal pain, constipation, diarrhea, nausea and vomiting.   Genitourinary:  Positive for flank pain, frequency and urgency. Negative for difficulty urinating and dysuria.   Musculoskeletal:  Positive for back pain.   Skin:  Negative for rash.   Psychiatric/Behavioral:  Negative for confusion and suicidal ideas.        Physical Exam:  Physical Exam  Constitutional:       General: She is not in acute distress.     Appearance: Normal appearance.   HENT:      Head: Normocephalic and atraumatic.      Nose: Nose normal.      Mouth/Throat:      Mouth: Mucous membranes are moist.   Eyes:      Conjunctiva/sclera: Conjunctivae normal.   Cardiovascular:      Rate and Rhythm: Normal rate and regular rhythm.      Pulses: Normal pulses.      Heart sounds: Normal heart sounds.   Pulmonary:      Effort: Pulmonary effort is normal.      Breath sounds: Normal breath sounds.   Abdominal:      General: Bowel sounds are normal.      Palpations: Abdomen is soft.   Musculoskeletal:         General: Normal range of motion.      Cervical back: Normal range of motion.   Skin:     General: Skin is warm.   Neurological:      General: No focal deficit present.      Mental Status: She is alert and oriented to person, place, and time.   Psychiatric:         Mood and Affect: Mood normal.         Behavior: Behavior normal.         Thought Content: Thought content normal.         Judgment: Judgment normal.             Recent Image (CT and/or KUB):   CT Abdomen and Pelvis: No results found for this or any previous visit.     CT Stone Protocol: No results found for this or any previous visit.     KUB: Results for orders placed in visit on 05/31/24    XR abdomen kub    Narrative  PROCEDURE: XR ABDOMEN KUB-    HISTORY: right nephrolithasis; N20.0-Calculus of kidney    COMPARISON: None.    FINDINGS: An AP view of the abdomen and pelvis demonstrates an  unremarkable bowel gas pattern with no evidence of obstruction.  There is  a 4 mm calcification overlying the lower right renal shadow. The left  renal shadow is obscured by bowel gas. Suspected phleboliths are present  in the pelvis.    Impression  Findings consistent with right nephrolithiasis.          This report was finalized on 5/31/2024 4:00 PM by Sandra Boyd M.D..       Labs:  Brief Urine Lab Results  (Last result in the past 365 days)        Color   Clarity   Blood   Leuk Est   Nitrite   Protein   CREAT   Urine HCG        05/31/24 1458 Yellow   Clear   Negative   Negative   Negative   Negative                 Office Visit on 05/31/2024   Component Date Value Ref Range Status    Color 05/31/2024 Yellow  Yellow, Straw, Dark Yellow, Jodie Final    Clarity, UA 05/31/2024 Clear  Clear Final    Specific Gravity  05/31/2024 1.025  1.005 - 1.030 Final    pH, Urine 05/31/2024 6.0  5.0 - 8.0 Final    Leukocytes 05/31/2024 Negative  Negative Final    Nitrite, UA 05/31/2024 Negative  Negative Final    Protein, POC 05/31/2024 Negative  Negative mg/dL Final    Glucose, UA 05/31/2024 Negative  Negative mg/dL Final    Ketones, UA 05/31/2024 Trace (A)  Negative Final    Urobilinogen, UA 05/31/2024 Normal  Normal, 0.2 E.U./dL Final    Bilirubin 05/31/2024 Negative  Negative Final    Blood, UA 05/31/2024 Negative  Negative Final    Lot Number 05/31/2024 [id number]   Final    Expiration Date 05/31/2024 10/25/24   Final    Urine Culture 05/31/2024 No growth   Final        Procedure: None  Procedures     I have reviewed and agree with the above PMH, PSH, FMH, social history, medications, allergies, and labs.     Assessment/Plan:   Problem List Items Addressed This Visit       Right nephrolithiasis    Overactive bladder - Primary    Relevant Medications    Mirabegron ER (Myrbetriq) 50 MG tablet sustained-release 24 hour 24 hr tablet       Health Maintenance:   Health Maintenance Due   Topic Date Due    BMI FOLLOWUP  Never done    TDAP/TD VACCINES (1 - Tdap) Never done    HEPATITIS  C SCREENING  Never done    ANNUAL PHYSICAL  Never done    PAP SMEAR  Never done    COVID-19 Vaccine (1 - 2023-24 season) Never done    INFLUENZA VACCINE  08/01/2024        Smoking Counseling: Never smoked or use smokeless tobacco    Urine Incontinence: Patient reports that she is not currently experiencing any symptoms of urinary incontinence.    Patient was given instructions and counseling regarding her condition or for health maintenance advice. Please see specific information pulled into the AVS if appropriate.    Patient Education:   Overactive bladder -patient has been on trial of Myrbetriq 25 mg once nightly as well as trospium with minimal improvement in lower urinary tract symptoms.  Recommend increase Myrbetriq to 50 mg once daily.  Discussed the risk and benefits of this medication in detail with the patient.  Also discussed the use of urodynamic study in office.  Discussed the nature of this procedure in detail.  We will get her scheduled appropriately for urodynamic study on October 11, 2024.  Advised to return to clinic sooner if needed.  She verbalized understanding.  Right nephrolithiasis -patient has not nonobstructing right kidney stone and has very minimal symptoms and recommending observation.  Did discuss the use of surgery such as extracorporal shockwave lithotripsy and she verbalized understanding.  Will see her back pending urodynamic study    Visit Diagnoses:    ICD-10-CM ICD-9-CM   1. Overactive bladder  N32.81 596.51   2. Right nephrolithiasis  N20.0 592.0     A total of 20 minutes were spent coordinating this patient’s care in clinic today; 10 minutes of which were face-to-face with the patient, reviewing medical history and counseling on the current treatment and followup plan.  All questions were answered to patient's satisfaction.    Meds Ordered During Visit:  New Medications Ordered This Visit   Medications    Mirabegron ER (Myrbetriq) 50 MG tablet sustained-release 24 hour 24 hr  tablet     Sig: Take 50 mg by mouth Daily.     Dispense:  90 tablet     Refill:  3       Follow Up Appointment: Urodynamic study  No follow-ups on file.      This document has been electronically signed by Jono Solomon PA-C   August 2, 2024 16:31 EDT    Part of this note may be an electronic transcription/translation of spoken language to printed text using the Dragon Dictation System.

## 2024-08-09 DIAGNOSIS — R35.0 FREQUENCY OF MICTURITION: ICD-10-CM

## 2024-08-09 DIAGNOSIS — N32.81 OVERACTIVE BLADDER: Primary | ICD-10-CM

## 2024-10-11 ENCOUNTER — PROCEDURE VISIT (OUTPATIENT)
Dept: UROLOGY | Facility: CLINIC | Age: 39
End: 2024-10-11
Payer: COMMERCIAL

## 2024-10-11 VITALS
HEIGHT: 65 IN | BODY MASS INDEX: 33.15 KG/M2 | SYSTOLIC BLOOD PRESSURE: 121 MMHG | DIASTOLIC BLOOD PRESSURE: 86 MMHG | WEIGHT: 199 LBS

## 2024-10-11 DIAGNOSIS — R35.0 FREQUENCY OF MICTURITION: ICD-10-CM

## 2024-10-11 DIAGNOSIS — N32.81 OVERACTIVE BLADDER: Primary | ICD-10-CM

## 2024-10-11 RX ORDER — GENTAMICIN 40 MG/ML
80 INJECTION, SOLUTION INTRAMUSCULAR; INTRAVENOUS ONCE
Status: COMPLETED | OUTPATIENT
Start: 2024-10-11 | End: 2024-10-11

## 2024-10-11 RX ADMIN — GENTAMICIN 80 MG: 40 INJECTION, SOLUTION INTRAMUSCULAR; INTRAVENOUS at 10:20

## 2024-10-11 NOTE — PROGRESS NOTES
PATIENT HERE FOR URODYNAMICS STUDY WITH N NURSE. SEE ATTACHMENT FOR RESULTSPATIENT HERE FOR URODYNAMICS STUDY WITH N NURSE. SEE ATTACHMENT FOR RESULTS

## 2024-11-07 ENCOUNTER — OFFICE VISIT (OUTPATIENT)
Dept: UROLOGY | Facility: CLINIC | Age: 39
End: 2024-11-07
Payer: COMMERCIAL

## 2024-11-07 VITALS
HEIGHT: 65 IN | DIASTOLIC BLOOD PRESSURE: 76 MMHG | WEIGHT: 198.8 LBS | HEART RATE: 81 BPM | BODY MASS INDEX: 33.12 KG/M2 | SYSTOLIC BLOOD PRESSURE: 116 MMHG

## 2024-11-07 DIAGNOSIS — N35.12 POSTINFECTIVE URETHRAL STRICTURE IN FEMALE: ICD-10-CM

## 2024-11-07 DIAGNOSIS — N32.81 OVERACTIVE BLADDER: Primary | ICD-10-CM

## 2024-11-07 NOTE — PROGRESS NOTES
"Chief Complaint:    Chief Complaint   Patient presents with    Overactive bladder       Vital Signs:   /76 (BP Location: Right arm, Patient Position: Sitting, Cuff Size: Adult)   Pulse 81   Ht 165.1 cm (65\")   Wt 90.2 kg (198 lb 12.8 oz)   BMI 33.08 kg/m²   Body mass index is 33.08 kg/m².      HPI:  Gabrielle Lopez is a 39 y.o. female who presents today for follow up    History of Present Illness  Ms. Lopez returns to the clinic today for follow-up for overactive bladder with urinary incontinence and difficulty with urination.  She was initially seen in office and been on anticholinergics in the past by Dr. Kang.  She also has a nonobstructing right intrarenal renal kidney stone with no significant symptomology at this time.  She was last seen in office on October 11, 2024 and underwent a urodynamic study at that time.  Her urodynamic study showed no evidence of stress incontinence at 90 mL.  Her bladder capacity was very small and had her first sensation to urinate 11 mL.  There was no evidence of any detrusor overactivity during her filling phase.  She had adequate bladder emptying with a low rate of flow and positive detrusor pressure with minimal abdominal straining.  It is to note during her cystoscopy she did have to have mild dilation prior to catheter insertion.  She returns today and is currently on Myrbetriq 50 mg once daily.  She reports that when she had began the medication she has noticed an improvement in her frequency and urgency symptoms.  She still has weak stream at times with some spraying of urination.  Dr. Rubio did recommend cystoscopy or trial of Gemtesa.  She wished to proceed forward a cystoscopy.  Will get her scheduled appropriately.      Past Medical History:  Past Medical History:   Diagnosis Date    Anemia     Chronic kidney disease     Irritable bowel     Kidney stone     Urinary tract infection 2022       Current Meds:  Current Outpatient Medications   Medication Sig " Dispense Refill    aspirin 81 MG oral suspension       atomoxetine (STRATTERA) 40 MG capsule Take 1 capsule by mouth Every Morning.      baclofen (LIORESAL) 10 MG/20ML injection       buPROPion XL (WELLBUTRIN XL) 300 MG 24 hr tablet Take 1 tablet by mouth Daily.      dicyclomine (BENTYL) 10 MG capsule Take 1 capsule by mouth As Needed.      Emgality 120 MG/ML auto-injector pen Inject 1 mL under the skin into the appropriate area as directed.      famotidine (PEPCID) 40 MG tablet Take 1 tablet by mouth Daily.      Loratadine 10 MG capsule Take  by mouth.      Magnesium 500 MG tablet       Melatonin 10 MG capsule       metoprolol tartrate (LOPRESSOR) 25 MG tablet Take 1 tablet by mouth 2 (Two) Times a Day.      metroNIDAZOLE (METROGEL) 1 % gel       Mirabegron ER (Myrbetriq) 50 MG tablet sustained-release 24 hour 24 hr tablet Take 50 mg by mouth Daily. 90 tablet 3    montelukast (SINGULAIR) 10 MG tablet Take 1 tablet by mouth Every Night.      Motegrity 2 MG tablet Take 1 tablet by mouth Daily.      Triamcinolone Acetonide (Nasacort Allergy 24HR) 55 MCG/ACT nasal inhaler       trospium (SANCTURA) 20 MG tablet Take 1 tablet by mouth 2 (Two) Times a Day.      Ubrelvy 100 MG tablet Take 1 tablet by mouth.      vitamin B-12 (CYANOCOBALAMIN) 100 MCG tablet Take 2.5 tablets by mouth Daily.       No current facility-administered medications for this visit.        Allergies:   Allergies   Allergen Reactions    Peanut Oil Nausea And Vomiting    Shellfish-Derived Products Hives    Wheat Nausea And Vomiting        Past Surgical History:  Past Surgical History:   Procedure Laterality Date     SECTION      CHOLECYSTECTOMY      EXTERNAL EAR SURGERY      closed ear ducts - both ears    HYSTERECTOMY      KIDNEY STONE SURGERY  21    LAPAROSCOPY DIAGNOSTIC / BIOPSY / ASPIRATION / LYSIS      LITHOTRIPSY  21       Social History:  Social History     Socioeconomic History    Marital status:    Tobacco Use     Smoking status: Never     Passive exposure: Never    Smokeless tobacco: Never   Vaping Use    Vaping status: Never Used   Substance and Sexual Activity    Alcohol use: No    Drug use: Never    Sexual activity: Yes     Partners: Male     Birth control/protection: Hysterectomy       Family History:  Family History   Problem Relation Age of Onset    Breast cancer Neg Hx        Review of Systems:  Review of Systems   Constitutional:  Negative for fatigue, fever and unexpected weight change.   Respiratory:  Negative for chest tightness and shortness of breath.    Cardiovascular:  Negative for chest pain.   Gastrointestinal:  Negative for abdominal pain, constipation, diarrhea, nausea and vomiting.   Genitourinary:  Positive for flank pain, frequency and urgency. Negative for difficulty urinating and dysuria.   Musculoskeletal:  Positive for back pain.   Skin:  Negative for rash.   Psychiatric/Behavioral:  Negative for confusion and suicidal ideas.        Physical Exam:  Physical Exam  Constitutional:       General: She is not in acute distress.     Appearance: Normal appearance.   HENT:      Head: Normocephalic.      Mouth/Throat:      Mouth: Mucous membranes are moist.      Pharynx: Oropharynx is clear.   Eyes:      Extraocular Movements: Extraocular movements intact.      Conjunctiva/sclera: Conjunctivae normal.   Cardiovascular:      Rate and Rhythm: Normal rate and regular rhythm.      Pulses: Normal pulses.      Heart sounds: Normal heart sounds.   Pulmonary:      Effort: Pulmonary effort is normal.      Breath sounds: Normal breath sounds.   Abdominal:      General: Abdomen is flat. Bowel sounds are normal.      Palpations: Abdomen is soft.   Genitourinary:     Rectum: Normal.   Musculoskeletal:      Cervical back: Normal range of motion.   Skin:     General: Skin is warm.   Neurological:      General: No focal deficit present.      Mental Status: She is alert and oriented to person, place, and time.   Psychiatric:          Mood and Affect: Mood normal.         Thought Content: Thought content normal.         Judgment: Judgment normal.         IPSS Questionnaire (AUA-7):  IPSS Questionnaire (AUA-7):                        Recent Image (CT and/or KUB):   CT Abdomen and Pelvis: No results found for this or any previous visit.     CT Stone Protocol: No results found for this or any previous visit.     KUB: Results for orders placed in visit on 05/31/24    XR abdomen kub    Narrative  PROCEDURE: XR ABDOMEN KUB-    HISTORY: right nephrolithasis; N20.0-Calculus of kidney    COMPARISON: None.    FINDINGS: An AP view of the abdomen and pelvis demonstrates an  unremarkable bowel gas pattern with no evidence of obstruction. There is  a 4 mm calcification overlying the lower right renal shadow. The left  renal shadow is obscured by bowel gas. Suspected phleboliths are present  in the pelvis.    Impression  Findings consistent with right nephrolithiasis.          This report was finalized on 5/31/2024 4:00 PM by Sandra Boyd M.D..       Labs:  Brief Urine Lab Results  (Last result in the past 365 days)        Color   Clarity   Blood   Leuk Est   Nitrite   Protein   CREAT   Urine HCG        05/31/24 1458 Yellow   Clear   Negative   Negative   Negative   Negative                 No visits with results within 3 Month(s) from this visit.   Latest known visit with results is:   Office Visit on 05/31/2024   Component Date Value Ref Range Status    Color 05/31/2024 Yellow  Yellow, Straw, Dark Yellow, Jodie Final    Clarity, UA 05/31/2024 Clear  Clear Final    Specific Gravity  05/31/2024 1.025  1.005 - 1.030 Final    pH, Urine 05/31/2024 6.0  5.0 - 8.0 Final    Leukocytes 05/31/2024 Negative  Negative Final    Nitrite, UA 05/31/2024 Negative  Negative Final    Protein, POC 05/31/2024 Negative  Negative mg/dL Final    Glucose, UA 05/31/2024 Negative  Negative mg/dL Final    Ketones, UA 05/31/2024 Trace (A)  Negative Final    Urobilinogen,  UA 05/31/2024 Normal  Normal, 0.2 E.U./dL Final    Bilirubin 05/31/2024 Negative  Negative Final    Blood, UA 05/31/2024 Negative  Negative Final    Lot Number 05/31/2024 [id number]   Final    Expiration Date 05/31/2024 10/25/24   Final    Urine Culture 05/31/2024 No growth   Final        Procedure: None  Procedures     I have reviewed and agree with the above PMH, PSH, FMH, social history, medications, allergies, and labs.     Assessment/Plan:   Problem List Items Addressed This Visit       Overactive bladder - Primary    Postinfective urethral stricture in female       Health Maintenance:   Health Maintenance Due   Topic Date Due    BMI FOLLOWUP  Never done    HEPATITIS C SCREENING  Never done    ANNUAL PHYSICAL  Never done    PAP SMEAR  Never done    INFLUENZA VACCINE  Never done    COVID-19 Vaccine (1 - 2024-25 season) Never done        Smoking Counseling: Never smoker smokeless tobacco    Urine Incontinence: Patient reports that she i is experiencing mild symptoms urinary incontinence but has had improvement on Myrbetriq    Patient was given instructions and counseling regarding her condition or for health maintenance advice. Please see specific information pulled into the AVS if appropriate.    Patient Education:   Overactive bladder/urinary incontinence -patient has had improvement on Myrbetriq 50 mg once daily and wishes to continue with medications at this time.  Did advise her to discontinue if she begins experiencing increasing difficulty urinating, decreased urinary output, or inability urinate.  Did discuss the use of other intervention such as Botox injections.  Discussed the 5% risk of retention with every 100 cc of Botox.  She wishes to hold off on Botox.  Urethral stricture -patient had significant difficulty with catheterization during urodynamic study.  She did have a low flow rate and I am recommending a cystoscopy for lower tract investigation with possible urethral dilation in office.  I  did discuss undergoing procedure in the OR under general anesthesia.  Discussed the risk of general anesthesia in detail as well.  Patient wishes to try in office first.  We will get the patient scheduled for a cystoscopy on November 20 at 130.    Visit Diagnoses:    ICD-10-CM ICD-9-CM   1. Overactive bladder  N32.81 596.51   2. Postinfective urethral stricture in female  N35.12 598.00     A total of 25 minutes were spent coordinating this patient’s care in clinic today; 15 minutes of which were face-to-face with the patient, reviewing medical history and counseling on the current treatment and followup plan.  All questions were answered to patient's satisfaction.    Meds Ordered During Visit:  No orders of the defined types were placed in this encounter.      Follow Up Appointment: Cystoscopy  No follow-ups on file.      This document has been electronically signed by Jono Solomon PA-C   November 7, 2024 16:14 EST    Part of this note may be an electronic transcription/translation of spoken language to printed text using the Dragon Dictation System.

## 2024-11-20 ENCOUNTER — PROCEDURE VISIT (OUTPATIENT)
Dept: UROLOGY | Facility: CLINIC | Age: 39
End: 2024-11-20
Payer: COMMERCIAL

## 2024-11-20 VITALS
DIASTOLIC BLOOD PRESSURE: 77 MMHG | HEART RATE: 81 BPM | SYSTOLIC BLOOD PRESSURE: 110 MMHG | BODY MASS INDEX: 33.22 KG/M2 | HEIGHT: 65 IN | WEIGHT: 199.4 LBS

## 2024-11-20 DIAGNOSIS — Z48.816 AFTERCARE FOLLOWING SURGERY OF THE GENITOURINARY SYSTEM: ICD-10-CM

## 2024-11-20 DIAGNOSIS — N35.12 POSTINFECTIVE URETHRAL STRICTURE IN FEMALE: Primary | ICD-10-CM

## 2024-11-20 RX ORDER — GENTAMICIN 40 MG/ML
80 INJECTION, SOLUTION INTRAMUSCULAR; INTRAVENOUS ONCE
Status: COMPLETED | OUTPATIENT
Start: 2024-11-20 | End: 2024-11-20

## 2024-11-20 RX ADMIN — GENTAMICIN 80 MG: 40 INJECTION, SOLUTION INTRAMUSCULAR; INTRAVENOUS at 13:55

## 2024-11-20 NOTE — PROGRESS NOTES
"Chief Complaint:    Chief Complaint   Patient presents with    Cystoscopy    Overactive bladder       Vital Signs:   /77 (BP Location: Right arm, Patient Position: Sitting, Cuff Size: Adult)   Pulse 81   Ht 165.1 cm (65\")   Wt 90.4 kg (199 lb 6.4 oz)   BMI 33.18 kg/m²   Body mass index is 33.18 kg/m².      HPI:  Gabrielle Lopez is a 39 y.o. female who presents today for follow up    History of Present Illness  Ms. Lopez returns to the clinic today for follow-up for overactive bladder with urinary incontinence and difficulty with urination.  She was initially seen in office and been on anticholinergics in the past by Dr. Kang.  She also has a nonobstructing right intrarenal renal kidney stone with no significant symptomology at this time.  She was last seen in office on October 11, 2024 and underwent a urodynamic study at that time.  Her urodynamic study showed no evidence of stress incontinence at 90 mL.  Her bladder capacity was very small and had her first sensation to urinate 11 mL.  There was no evidence of any detrusor overactivity during her filling phase.  She had adequate bladder emptying with a low rate of flow and positive detrusor pressure with minimal abdominal straining.  It is to note during her cystoscopy she did have to have mild dilation prior to catheter insertion.  She has been taking Myrbetriq 50 mg once daily with improvement in frequency and urgency symptoms.  She still has a weak urinary stream and did wish to proceed forward with cystoscopy in office today.  I did attempt a cystoscopy however she required dilation and could not tolerate this well.  We will get her scheduled in the OR with Dr. Rubio on December 9.      Past Medical History:  Past Medical History:   Diagnosis Date    Anemia     Chronic kidney disease     Irritable bowel     Kidney stone     Urinary tract infection 2022       Current Meds:  Current Outpatient Medications   Medication Sig Dispense Refill    aspirin 81 " MG oral suspension       atomoxetine (STRATTERA) 40 MG capsule Take 1 capsule by mouth Every Morning.      baclofen (LIORESAL) 10 MG/20ML injection       buPROPion XL (WELLBUTRIN XL) 300 MG 24 hr tablet Take 1 tablet by mouth Daily.      dicyclomine (BENTYL) 10 MG capsule Take 1 capsule by mouth As Needed.      Emgality 120 MG/ML auto-injector pen Inject 1 mL under the skin into the appropriate area as directed.      famotidine (PEPCID) 40 MG tablet Take 1 tablet by mouth Daily.      Loratadine 10 MG capsule Take  by mouth.      Magnesium 500 MG tablet       Melatonin 10 MG capsule       metoprolol tartrate (LOPRESSOR) 25 MG tablet Take 1 tablet by mouth 2 (Two) Times a Day.      metroNIDAZOLE (METROGEL) 1 % gel       Mirabegron ER (Myrbetriq) 50 MG tablet sustained-release 24 hour 24 hr tablet Take 50 mg by mouth Daily. 90 tablet 3    montelukast (SINGULAIR) 10 MG tablet Take 1 tablet by mouth Every Night.      Motegrity 2 MG tablet Take 1 tablet by mouth Daily.      Triamcinolone Acetonide (Nasacort Allergy 24HR) 55 MCG/ACT nasal inhaler       trospium (SANCTURA) 20 MG tablet Take 1 tablet by mouth 2 (Two) Times a Day.      Ubrelvy 100 MG tablet Take 1 tablet by mouth.      vitamin B-12 (CYANOCOBALAMIN) 100 MCG tablet Take 2.5 tablets by mouth Daily.       No current facility-administered medications for this visit.        Allergies:   Allergies   Allergen Reactions    Peanut Oil Nausea And Vomiting    Shellfish-Derived Products Hives    Wheat Nausea And Vomiting        Past Surgical History:  Past Surgical History:   Procedure Laterality Date     SECTION      CHOLECYSTECTOMY      EXTERNAL EAR SURGERY      closed ear ducts - both ears    HYSTERECTOMY      KIDNEY STONE SURGERY  21    LAPAROSCOPY DIAGNOSTIC / BIOPSY / ASPIRATION / LYSIS      LITHOTRIPSY  21       Social History:  Social History     Socioeconomic History    Marital status:    Tobacco Use    Smoking status: Never     Passive  exposure: Never    Smokeless tobacco: Never   Vaping Use    Vaping status: Never Used   Substance and Sexual Activity    Alcohol use: No    Drug use: Never    Sexual activity: Yes     Partners: Male     Birth control/protection: Hysterectomy       Family History:  Family History   Problem Relation Age of Onset    Breast cancer Neg Hx        Review of Systems:  Review of Systems   Constitutional:  Negative for fatigue, fever and unexpected weight change.   Respiratory:  Negative for chest tightness and shortness of breath.    Cardiovascular:  Negative for chest pain.   Gastrointestinal:  Negative for abdominal pain, constipation, diarrhea, nausea and vomiting.   Genitourinary:  Positive for flank pain, frequency and urgency. Negative for difficulty urinating and dysuria.   Musculoskeletal:  Positive for back pain.   Skin:  Negative for rash.   Psychiatric/Behavioral:  Negative for confusion and suicidal ideas.        Physical Exam:  Physical Exam  Constitutional:       General: She is not in acute distress.     Appearance: Normal appearance.   HENT:      Head: Normocephalic.      Mouth/Throat:      Mouth: Mucous membranes are moist.      Pharynx: Oropharynx is clear.   Eyes:      Extraocular Movements: Extraocular movements intact.      Conjunctiva/sclera: Conjunctivae normal.   Cardiovascular:      Rate and Rhythm: Normal rate and regular rhythm.      Pulses: Normal pulses.      Heart sounds: Normal heart sounds.   Pulmonary:      Effort: Pulmonary effort is normal.      Breath sounds: Normal breath sounds.   Abdominal:      General: Abdomen is flat. Bowel sounds are normal.      Palpations: Abdomen is soft.   Genitourinary:     Rectum: Normal.      Comments: Significant narrow urethral meatal opening.  Difficulty with catheterization  Musculoskeletal:      Cervical back: Normal range of motion.   Skin:     General: Skin is warm.   Neurological:      General: No focal deficit present.      Mental Status: She is  alert and oriented to person, place, and time.   Psychiatric:         Mood and Affect: Mood normal.         Thought Content: Thought content normal.         Judgment: Judgment normal.         IPSS Questionnaire (AUA-7):  IPSS Questionnaire (AUA-7):                        Recent Image (CT and/or KUB):   CT Abdomen and Pelvis: No results found for this or any previous visit.     CT Stone Protocol: No results found for this or any previous visit.     KUB: Results for orders placed in visit on 05/31/24    XR abdomen kub    Narrative  PROCEDURE: XR ABDOMEN KUB-    HISTORY: right nephrolithasis; N20.0-Calculus of kidney    COMPARISON: None.    FINDINGS: An AP view of the abdomen and pelvis demonstrates an  unremarkable bowel gas pattern with no evidence of obstruction. There is  a 4 mm calcification overlying the lower right renal shadow. The left  renal shadow is obscured by bowel gas. Suspected phleboliths are present  in the pelvis.    Impression  Findings consistent with right nephrolithiasis.          This report was finalized on 5/31/2024 4:00 PM by Sandra Boyd M.D..       Labs:  Brief Urine Lab Results  (Last result in the past 365 days)        Color   Clarity   Blood   Leuk Est   Nitrite   Protein   CREAT   Urine HCG        05/31/24 1458 Yellow   Clear   Negative   Negative   Negative   Negative                 No visits with results within 3 Month(s) from this visit.   Latest known visit with results is:   Office Visit on 05/31/2024   Component Date Value Ref Range Status    Color 05/31/2024 Yellow  Yellow, Straw, Dark Yellow, Jodie Final    Clarity, UA 05/31/2024 Clear  Clear Final    Specific Gravity  05/31/2024 1.025  1.005 - 1.030 Final    pH, Urine 05/31/2024 6.0  5.0 - 8.0 Final    Leukocytes 05/31/2024 Negative  Negative Final    Nitrite, UA 05/31/2024 Negative  Negative Final    Protein, POC 05/31/2024 Negative  Negative mg/dL Final    Glucose, UA 05/31/2024 Negative  Negative mg/dL Final     Ketones, UA 05/31/2024 Trace (A)  Negative Final    Urobilinogen, UA 05/31/2024 Normal  Normal, 0.2 E.U./dL Final    Bilirubin 05/31/2024 Negative  Negative Final    Blood, UA 05/31/2024 Negative  Negative Final    Lot Number 05/31/2024 [id number]   Final    Expiration Date 05/31/2024 10/25/24   Final    Urine Culture 05/31/2024 No growth   Final        Procedure: None  Insert Non-indwelling Bladder Catheter    Date/Time: 11/20/2024 2:01 PM    Performed by: Jono Solomon PA-C  Authorized by: Jono Solomon PA-C  Consent: Verbal consent obtained.  Risks and benefits: risks, benefits and alternatives were discussed  Consent given by: patient  Patient understanding: patient states understanding of the procedure being performed  Patient consent: the patient's understanding of the procedure matches consent given  Procedure consent: procedure consent matches procedure scheduled  Relevant documents: relevant documents present and verified  Test results: test results available and properly labeled  Site marked: the operative site was marked  Imaging studies: imaging studies available  Patient identity confirmed: verbally with patient  Preparation: Patient was prepped and draped in the usual sterile fashion.  Local anesthesia used: no    Anesthesia:  Local anesthesia used: no    Sedation:  Patient sedated: no    Patient tolerance: patient tolerated the procedure well with no immediate complications      Cystoscopy    Date/Time: 11/20/2024 2:01 PM    Performed by: Jono Solomon PA-C  Authorized by: Jono Solomon PA-C  Consent: Verbal consent obtained.  Risks and benefits: risks, benefits and alternatives were discussed  Consent given by: patient  Patient understanding: patient states understanding of the procedure being performed  Patient consent: the patient's understanding of the procedure matches consent given  Procedure consent: procedure consent matches procedure scheduled  Relevant documents: relevant  "documents present and verified  Test results: test results available and properly labeled  Site marked: the operative site was marked  Imaging studies: imaging studies available  Patient identity confirmed: verbally with patient  Time out: Immediately prior to procedure a \"time out\" was called to verify the correct patient, procedure, equipment, support staff and site/side marked as required.  Preparation: Patient was prepped and draped in the usual sterile fashion.  Local anesthesia used: yes    Anesthesia:  Local anesthesia used: yes  Local Anesthetic: topical anesthetic  Patient tolerance: patient tolerated the procedure well with no immediate complications  Comments: Patient presents today for cystourethroscopy.  I went ahead and obtained an informed consent including the risks of anesthesia, bleeding, infection, etc.  After prepping and draping in a sterile fashion in the low dorsal lithotomy position, the urethra was gently anesthetized with 10 mL of 2% viscous Xylocaine jelly.  After an appropriate period of topical anesthesia, I used the Olympus digital 14 [place] flexible cystoscope to examine the anterior urethra which was showed a significant urethral stricture.  I did not attempt a dilator with a 14 Ukrainian sound and she could not tolerate this well.  I did readvanced the cystoscopy which revealed significant stricturing urethral inflammation.  I was unable to visualize the bladder appropriately.  Will get her scheduled in the OR for dilation      Urethral dilation    Date/Time: 11/20/2024 2:02 PM    Performed by: Jono Solomon PA-C  Authorized by: Jono Solomon PA-C  Consent: Verbal consent obtained.  Risks and benefits: risks, benefits and alternatives were discussed  Patient understanding: patient states understanding of the procedure being performed  Patient consent: the patient's understanding of the procedure matches consent given  Procedure consent: procedure consent matches procedure " "scheduled  Relevant documents: relevant documents present and verified  Test results: test results available and properly labeled  Site marked: the operative site was marked  Patient identity confirmed: verbally with patient  Time out: Immediately prior to procedure a \"time out\" was called to verify the correct patient, procedure, equipment, support staff and site/side marked as required.  Preparation: Patient was prepped and draped in the usual sterile fashion.  Local anesthesia used: no    Anesthesia:  Local anesthesia used: no    Sedation:  Patient sedated: no    Patient tolerance: patient tolerated the procedure well with no immediate complications           I have reviewed and agree with the above PMH, PSH, FMH, social history, medications, allergies, and labs.     Assessment/Plan:   Problem List Items Addressed This Visit       Postinfective urethral stricture in female - Primary    Relevant Orders    Case Request     Other Visit Diagnoses       Aftercare following surgery of the genitourinary system        Relevant Medications    gentamicin (GARAMYCIN) injection 80 mg (Completed)              Health Maintenance:   Health Maintenance Due   Topic Date Due    BMI FOLLOWUP  Never done    HEPATITIS C SCREENING  Never done    ANNUAL PHYSICAL  Never done    PAP SMEAR  Never done    INFLUENZA VACCINE  Never done    COVID-19 Vaccine (1 - 2024-25 season) Never done        Smoking Counseling: Never smoker smokeless tobacco    Urine Incontinence: Patient reports that she i is experiencing mild symptoms urinary incontinence but has had improvement on Myrbetriq    Patient was given instructions and counseling regarding her condition or for health maintenance advice. Please see specific information pulled into the AVS if appropriate.    Patient Education:   Urethral stricture -patient attempted cystoscopy however required dilation in office today.  I was unsuccessful dilation and patient could not tolerate the procedure " well.  We will get her scheduled for a anesthetic cystoscopy with urethral dilation with Dr. Rubio on December 9, 2024.  She verbalized understanding.    Visit Diagnoses:    ICD-10-CM ICD-9-CM   1. Postinfective urethral stricture in female  N35.12 598.00   2. Aftercare following surgery of the genitourinary system  Z48.816 V58.76       Meds Ordered During Visit:  New Medications Ordered This Visit   Medications    gentamicin (GARAMYCIN) injection 80 mg       Follow Up Appointment: Urethral dilation in the OR  No follow-ups on file.      This document has been electronically signed by Jono Solomon PA-C   November 20, 2024 14:04 EST    Part of this note may be an electronic transcription/translation of spoken language to printed text using the Dragon Dictation System.

## 2024-11-20 NOTE — H&P (VIEW-ONLY)
"Chief Complaint:    Chief Complaint   Patient presents with    Cystoscopy    Overactive bladder       Vital Signs:   /77 (BP Location: Right arm, Patient Position: Sitting, Cuff Size: Adult)   Pulse 81   Ht 165.1 cm (65\")   Wt 90.4 kg (199 lb 6.4 oz)   BMI 33.18 kg/m²   Body mass index is 33.18 kg/m².      HPI:  Gabrielle Lopez is a 39 y.o. female who presents today for follow up    History of Present Illness  Ms. Lopez returns to the clinic today for follow-up for overactive bladder with urinary incontinence and difficulty with urination.  She was initially seen in office and been on anticholinergics in the past by Dr. Kang.  She also has a nonobstructing right intrarenal renal kidney stone with no significant symptomology at this time.  She was last seen in office on October 11, 2024 and underwent a urodynamic study at that time.  Her urodynamic study showed no evidence of stress incontinence at 90 mL.  Her bladder capacity was very small and had her first sensation to urinate 11 mL.  There was no evidence of any detrusor overactivity during her filling phase.  She had adequate bladder emptying with a low rate of flow and positive detrusor pressure with minimal abdominal straining.  It is to note during her cystoscopy she did have to have mild dilation prior to catheter insertion.  She has been taking Myrbetriq 50 mg once daily with improvement in frequency and urgency symptoms.  She still has a weak urinary stream and did wish to proceed forward with cystoscopy in office today.  I did attempt a cystoscopy however she required dilation and could not tolerate this well.  We will get her scheduled in the OR with Dr. Rubio on December 9.      Past Medical History:  Past Medical History:   Diagnosis Date    Anemia     Chronic kidney disease     Irritable bowel     Kidney stone     Urinary tract infection 2022       Current Meds:  Current Outpatient Medications   Medication Sig Dispense Refill    aspirin 81 " MG oral suspension       atomoxetine (STRATTERA) 40 MG capsule Take 1 capsule by mouth Every Morning.      baclofen (LIORESAL) 10 MG/20ML injection       buPROPion XL (WELLBUTRIN XL) 300 MG 24 hr tablet Take 1 tablet by mouth Daily.      dicyclomine (BENTYL) 10 MG capsule Take 1 capsule by mouth As Needed.      Emgality 120 MG/ML auto-injector pen Inject 1 mL under the skin into the appropriate area as directed.      famotidine (PEPCID) 40 MG tablet Take 1 tablet by mouth Daily.      Loratadine 10 MG capsule Take  by mouth.      Magnesium 500 MG tablet       Melatonin 10 MG capsule       metoprolol tartrate (LOPRESSOR) 25 MG tablet Take 1 tablet by mouth 2 (Two) Times a Day.      metroNIDAZOLE (METROGEL) 1 % gel       Mirabegron ER (Myrbetriq) 50 MG tablet sustained-release 24 hour 24 hr tablet Take 50 mg by mouth Daily. 90 tablet 3    montelukast (SINGULAIR) 10 MG tablet Take 1 tablet by mouth Every Night.      Motegrity 2 MG tablet Take 1 tablet by mouth Daily.      Triamcinolone Acetonide (Nasacort Allergy 24HR) 55 MCG/ACT nasal inhaler       trospium (SANCTURA) 20 MG tablet Take 1 tablet by mouth 2 (Two) Times a Day.      Ubrelvy 100 MG tablet Take 1 tablet by mouth.      vitamin B-12 (CYANOCOBALAMIN) 100 MCG tablet Take 2.5 tablets by mouth Daily.       No current facility-administered medications for this visit.        Allergies:   Allergies   Allergen Reactions    Peanut Oil Nausea And Vomiting    Shellfish-Derived Products Hives    Wheat Nausea And Vomiting        Past Surgical History:  Past Surgical History:   Procedure Laterality Date     SECTION      CHOLECYSTECTOMY      EXTERNAL EAR SURGERY      closed ear ducts - both ears    HYSTERECTOMY      KIDNEY STONE SURGERY  21    LAPAROSCOPY DIAGNOSTIC / BIOPSY / ASPIRATION / LYSIS      LITHOTRIPSY  21       Social History:  Social History     Socioeconomic History    Marital status:    Tobacco Use    Smoking status: Never     Passive  exposure: Never    Smokeless tobacco: Never   Vaping Use    Vaping status: Never Used   Substance and Sexual Activity    Alcohol use: No    Drug use: Never    Sexual activity: Yes     Partners: Male     Birth control/protection: Hysterectomy       Family History:  Family History   Problem Relation Age of Onset    Breast cancer Neg Hx        Review of Systems:  Review of Systems   Constitutional:  Negative for fatigue, fever and unexpected weight change.   Respiratory:  Negative for chest tightness and shortness of breath.    Cardiovascular:  Negative for chest pain.   Gastrointestinal:  Negative for abdominal pain, constipation, diarrhea, nausea and vomiting.   Genitourinary:  Positive for flank pain, frequency and urgency. Negative for difficulty urinating and dysuria.   Musculoskeletal:  Positive for back pain.   Skin:  Negative for rash.   Psychiatric/Behavioral:  Negative for confusion and suicidal ideas.        Physical Exam:  Physical Exam  Constitutional:       General: She is not in acute distress.     Appearance: Normal appearance.   HENT:      Head: Normocephalic.      Mouth/Throat:      Mouth: Mucous membranes are moist.      Pharynx: Oropharynx is clear.   Eyes:      Extraocular Movements: Extraocular movements intact.      Conjunctiva/sclera: Conjunctivae normal.   Cardiovascular:      Rate and Rhythm: Normal rate and regular rhythm.      Pulses: Normal pulses.      Heart sounds: Normal heart sounds.   Pulmonary:      Effort: Pulmonary effort is normal.      Breath sounds: Normal breath sounds.   Abdominal:      General: Abdomen is flat. Bowel sounds are normal.      Palpations: Abdomen is soft.   Genitourinary:     Rectum: Normal.      Comments: Significant narrow urethral meatal opening.  Difficulty with catheterization  Musculoskeletal:      Cervical back: Normal range of motion.   Skin:     General: Skin is warm.   Neurological:      General: No focal deficit present.      Mental Status: She is  alert and oriented to person, place, and time.   Psychiatric:         Mood and Affect: Mood normal.         Thought Content: Thought content normal.         Judgment: Judgment normal.         IPSS Questionnaire (AUA-7):  IPSS Questionnaire (AUA-7):                        Recent Image (CT and/or KUB):   CT Abdomen and Pelvis: No results found for this or any previous visit.     CT Stone Protocol: No results found for this or any previous visit.     KUB: Results for orders placed in visit on 05/31/24    XR abdomen kub    Narrative  PROCEDURE: XR ABDOMEN KUB-    HISTORY: right nephrolithasis; N20.0-Calculus of kidney    COMPARISON: None.    FINDINGS: An AP view of the abdomen and pelvis demonstrates an  unremarkable bowel gas pattern with no evidence of obstruction. There is  a 4 mm calcification overlying the lower right renal shadow. The left  renal shadow is obscured by bowel gas. Suspected phleboliths are present  in the pelvis.    Impression  Findings consistent with right nephrolithiasis.          This report was finalized on 5/31/2024 4:00 PM by Sandra Boyd M.D..       Labs:  Brief Urine Lab Results  (Last result in the past 365 days)        Color   Clarity   Blood   Leuk Est   Nitrite   Protein   CREAT   Urine HCG        05/31/24 1458 Yellow   Clear   Negative   Negative   Negative   Negative                 No visits with results within 3 Month(s) from this visit.   Latest known visit with results is:   Office Visit on 05/31/2024   Component Date Value Ref Range Status    Color 05/31/2024 Yellow  Yellow, Straw, Dark Yellow, Jodie Final    Clarity, UA 05/31/2024 Clear  Clear Final    Specific Gravity  05/31/2024 1.025  1.005 - 1.030 Final    pH, Urine 05/31/2024 6.0  5.0 - 8.0 Final    Leukocytes 05/31/2024 Negative  Negative Final    Nitrite, UA 05/31/2024 Negative  Negative Final    Protein, POC 05/31/2024 Negative  Negative mg/dL Final    Glucose, UA 05/31/2024 Negative  Negative mg/dL Final     Ketones, UA 05/31/2024 Trace (A)  Negative Final    Urobilinogen, UA 05/31/2024 Normal  Normal, 0.2 E.U./dL Final    Bilirubin 05/31/2024 Negative  Negative Final    Blood, UA 05/31/2024 Negative  Negative Final    Lot Number 05/31/2024 [id number]   Final    Expiration Date 05/31/2024 10/25/24   Final    Urine Culture 05/31/2024 No growth   Final        Procedure: None  Insert Non-indwelling Bladder Catheter    Date/Time: 11/20/2024 2:01 PM    Performed by: Jono Solomon PA-C  Authorized by: Jono Solomon PA-C  Consent: Verbal consent obtained.  Risks and benefits: risks, benefits and alternatives were discussed  Consent given by: patient  Patient understanding: patient states understanding of the procedure being performed  Patient consent: the patient's understanding of the procedure matches consent given  Procedure consent: procedure consent matches procedure scheduled  Relevant documents: relevant documents present and verified  Test results: test results available and properly labeled  Site marked: the operative site was marked  Imaging studies: imaging studies available  Patient identity confirmed: verbally with patient  Preparation: Patient was prepped and draped in the usual sterile fashion.  Local anesthesia used: no    Anesthesia:  Local anesthesia used: no    Sedation:  Patient sedated: no    Patient tolerance: patient tolerated the procedure well with no immediate complications      Cystoscopy    Date/Time: 11/20/2024 2:01 PM    Performed by: Jono Solomon PA-C  Authorized by: Jono Solomon PA-C  Consent: Verbal consent obtained.  Risks and benefits: risks, benefits and alternatives were discussed  Consent given by: patient  Patient understanding: patient states understanding of the procedure being performed  Patient consent: the patient's understanding of the procedure matches consent given  Procedure consent: procedure consent matches procedure scheduled  Relevant documents: relevant  "documents present and verified  Test results: test results available and properly labeled  Site marked: the operative site was marked  Imaging studies: imaging studies available  Patient identity confirmed: verbally with patient  Time out: Immediately prior to procedure a \"time out\" was called to verify the correct patient, procedure, equipment, support staff and site/side marked as required.  Preparation: Patient was prepped and draped in the usual sterile fashion.  Local anesthesia used: yes    Anesthesia:  Local anesthesia used: yes  Local Anesthetic: topical anesthetic  Patient tolerance: patient tolerated the procedure well with no immediate complications  Comments: Patient presents today for cystourethroscopy.  I went ahead and obtained an informed consent including the risks of anesthesia, bleeding, infection, etc.  After prepping and draping in a sterile fashion in the low dorsal lithotomy position, the urethra was gently anesthetized with 10 mL of 2% viscous Xylocaine jelly.  After an appropriate period of topical anesthesia, I used the Olympus digital 14 Faroese flexible cystoscope to examine the anterior urethra which was showed a significant urethral stricture.  I did not attempt a dilator with a 14 Faroese sound and she could not tolerate this well.  I did readvanced the cystoscopy which revealed significant stricturing urethral inflammation.  I was unable to visualize the bladder appropriately.  Will get her scheduled in the OR for dilation      Urethral dilation    Date/Time: 11/20/2024 2:02 PM    Performed by: Jono Solomon PA-C  Authorized by: Jono Solomon PA-C  Consent: Verbal consent obtained.  Risks and benefits: risks, benefits and alternatives were discussed  Patient understanding: patient states understanding of the procedure being performed  Patient consent: the patient's understanding of the procedure matches consent given  Procedure consent: procedure consent matches procedure " "scheduled  Relevant documents: relevant documents present and verified  Test results: test results available and properly labeled  Site marked: the operative site was marked  Patient identity confirmed: verbally with patient  Time out: Immediately prior to procedure a \"time out\" was called to verify the correct patient, procedure, equipment, support staff and site/side marked as required.  Preparation: Patient was prepped and draped in the usual sterile fashion.  Local anesthesia used: no    Anesthesia:  Local anesthesia used: no    Sedation:  Patient sedated: no    Patient tolerance: patient tolerated the procedure well with no immediate complications           I have reviewed and agree with the above PMH, PSH, FMH, social history, medications, allergies, and labs.     Assessment/Plan:   Problem List Items Addressed This Visit       Postinfective urethral stricture in female - Primary    Relevant Orders    Case Request     Other Visit Diagnoses       Aftercare following surgery of the genitourinary system        Relevant Medications    gentamicin (GARAMYCIN) injection 80 mg (Completed)              Health Maintenance:   Health Maintenance Due   Topic Date Due    BMI FOLLOWUP  Never done    HEPATITIS C SCREENING  Never done    ANNUAL PHYSICAL  Never done    PAP SMEAR  Never done    INFLUENZA VACCINE  Never done    COVID-19 Vaccine (1 - 2024-25 season) Never done        Smoking Counseling: Never smoker smokeless tobacco    Urine Incontinence: Patient reports that she i is experiencing mild symptoms urinary incontinence but has had improvement on Myrbetriq    Patient was given instructions and counseling regarding her condition or for health maintenance advice. Please see specific information pulled into the AVS if appropriate.    Patient Education:   Urethral stricture -patient attempted cystoscopy however required dilation in office today.  I was unsuccessful dilation and patient could not tolerate the procedure " well.  We will get her scheduled for a anesthetic cystoscopy with urethral dilation with Dr. Rubio on December 9, 2024.  She verbalized understanding.    Visit Diagnoses:    ICD-10-CM ICD-9-CM   1. Postinfective urethral stricture in female  N35.12 598.00   2. Aftercare following surgery of the genitourinary system  Z48.816 V58.76       Meds Ordered During Visit:  New Medications Ordered This Visit   Medications    gentamicin (GARAMYCIN) injection 80 mg       Follow Up Appointment: Urethral dilation in the OR  No follow-ups on file.      This document has been electronically signed by Jono Solomon PA-C   November 20, 2024 14:04 EST    Part of this note may be an electronic transcription/translation of spoken language to printed text using the Dragon Dictation System.

## 2024-12-04 NOTE — DISCHARGE INSTRUCTIONS
Please discontinue all blood thinners and anticoagulants (except aspirin) prior to surgery as per your surgeon and cardiologist instructions.  Aspirin may be continued up to the day prior to surgery.    HOLD all diabetic medications the morning of surgery as order by physician.    Please follow instructions on use of prep cloths provided by nurse. Return instruction sheet to pre-op nurse on day of surgery.    Arrival time for surgery on  12/9/24 will be given to you by Dr. Rubio's  Office.    A RESPONSIBLE PERSON MUST REMAIN IN THE WAITING ROOM DURING YOUR PROCEDURE AND A RESPONSIBLE  MUST BE AVAILABLE UPON YOUR DISCHARGE.    General Instructions:  Do NOT eat or drink after midnight 12/8/24 which includes water, mints, or gum.  You may brush your teeth. Dental appliances that are removable must be taken out day of surgery.  Do NOT smoke, chew tobacco, or drink alcohol within 24 hours prior to surgery.  Bring medications in original bottles, any inhalers and if applicable your C-PAP/BI-PAP machine  Bring any papers given to you in the doctor’s office  Wear clean, comfortable clothes and socks  Do NOT wear contact lenses or make-up or dark nail polish.  Bring a case for your glasses if applicable.  Bring crutches or walker if applicable  Leave all other valuables and jewelry at home  If you were given a blood bank armband, continue to wear it until discharged.    Preventing a Surgical Site Infection:  Shower the night before surgery (unless instructed otherwise) using a fresh bar of anti-bacterial soap (such as Dial) and clean washcloth.  Dry with a clean towel and dress in clean clothing.  For 2 to 3 days before surgery, avoid shaving with a razor near where you will have surgery because the razor can irritate skin and make it easier to develop an infection.  Ask your surgeon if you will be receiving antibiotics prior to surgery.  Make sure you, your family, and all healthcare providers clean their hands with  soap and water or an alcohol-based hand  before caring for you or your wound.  If at all possible, quit smoking as many days before surgery as you can.    Day of Surgery:  Upon arrival, a pre-op nurse and anesthesiologist will review your health history, obtain vital signs, and answer questions you may have.  The only belongings needed at this time will be your home medications and if applicable you C-PAP/BI-PAP machine.  If you are staying overnight, your family can leave the rest of your belongings in the car and bring them to your room later.  A pre-op nurse will start an IV and you may receive medication in preparation for surgery.  Due to patient privacy and limited space, only one member of your family will be able to accompany you in the pre-op area.  While you are in surgery your family should notify the waiting room  if they leave the waiting room area and provide a contact number.  Please be aware that surgery does come with discomfort.  We want to make every effort to control your discomfort so please discuss any uncontrolled symptoms with your nurse.  Your doctor will most likely have prescribed pain medications.  If you are going home after surgery you will receive individualized written care instructions before being discharged.  A responsible adult must drive you to and from the hospital on the day of surgery and stay with you for 24 hours.  If you are staying overnight following surgery, you will be transported to your hospital room following the recovery period.

## 2024-12-06 ENCOUNTER — PRE-ADMISSION TESTING (OUTPATIENT)
Dept: PREADMISSION TESTING | Facility: HOSPITAL | Age: 39
End: 2024-12-06
Payer: COMMERCIAL

## 2024-12-06 LAB
ANION GAP SERPL CALCULATED.3IONS-SCNC: 12.8 MMOL/L (ref 5–15)
BUN SERPL-MCNC: 18 MG/DL (ref 6–20)
BUN/CREAT SERPL: 22.5 (ref 7–25)
CALCIUM SPEC-SCNC: 9 MG/DL (ref 8.6–10.5)
CHLORIDE SERPL-SCNC: 104 MMOL/L (ref 98–107)
CO2 SERPL-SCNC: 26.2 MMOL/L (ref 22–29)
CREAT SERPL-MCNC: 0.8 MG/DL (ref 0.57–1)
DEPRECATED RDW RBC AUTO: 41.1 FL (ref 37–54)
EGFRCR SERPLBLD CKD-EPI 2021: 95.1 ML/MIN/1.73
ERYTHROCYTE [DISTWIDTH] IN BLOOD BY AUTOMATED COUNT: 12.6 % (ref 12.3–15.4)
GLUCOSE SERPL-MCNC: 105 MG/DL (ref 65–99)
HCT VFR BLD AUTO: 43.3 % (ref 34–46.6)
HGB BLD-MCNC: 13.7 G/DL (ref 12–15.9)
MCH RBC QN AUTO: 27.8 PG (ref 26.6–33)
MCHC RBC AUTO-ENTMCNC: 31.6 G/DL (ref 31.5–35.7)
MCV RBC AUTO: 88 FL (ref 79–97)
PLATELET # BLD AUTO: 282 10*3/MM3 (ref 140–450)
PMV BLD AUTO: 9 FL (ref 6–12)
POTASSIUM SERPL-SCNC: 4.4 MMOL/L (ref 3.5–5.2)
RBC # BLD AUTO: 4.92 10*6/MM3 (ref 3.77–5.28)
SODIUM SERPL-SCNC: 143 MMOL/L (ref 136–145)
WBC NRBC COR # BLD AUTO: 7.32 10*3/MM3 (ref 3.4–10.8)

## 2024-12-06 PROCEDURE — 80048 BASIC METABOLIC PNL TOTAL CA: CPT

## 2024-12-06 PROCEDURE — 36415 COLL VENOUS BLD VENIPUNCTURE: CPT

## 2024-12-06 PROCEDURE — 85027 COMPLETE CBC AUTOMATED: CPT

## 2024-12-06 RX ORDER — BACLOFEN 10 MG/1
10 TABLET ORAL 3 TIMES DAILY
COMMUNITY

## 2024-12-06 RX ORDER — LEVOCETIRIZINE DIHYDROCHLORIDE 5 MG/1
5 TABLET, FILM COATED ORAL EVERY EVENING
COMMUNITY

## 2024-12-06 RX ORDER — PROPRANOLOL HYDROCHLORIDE 40 MG/1
40 TABLET ORAL 2 TIMES DAILY
COMMUNITY

## 2024-12-09 ENCOUNTER — HOSPITAL ENCOUNTER (OUTPATIENT)
Facility: HOSPITAL | Age: 39
Setting detail: HOSPITAL OUTPATIENT SURGERY
Discharge: HOME | End: 2024-12-09
Attending: UROLOGY | Admitting: UROLOGY
Payer: COMMERCIAL

## 2024-12-09 ENCOUNTER — APPOINTMENT (OUTPATIENT)
Dept: GENERAL RADIOLOGY | Facility: HOSPITAL | Age: 39
End: 2024-12-09
Payer: COMMERCIAL

## 2024-12-09 ENCOUNTER — ANESTHESIA (OUTPATIENT)
Dept: PERIOP | Facility: HOSPITAL | Age: 39
End: 2024-12-09
Payer: COMMERCIAL

## 2024-12-09 ENCOUNTER — ANESTHESIA EVENT (OUTPATIENT)
Dept: PERIOP | Facility: HOSPITAL | Age: 39
End: 2024-12-09
Payer: COMMERCIAL

## 2024-12-09 VITALS
OXYGEN SATURATION: 99 % | HEART RATE: 71 BPM | WEIGHT: 198 LBS | DIASTOLIC BLOOD PRESSURE: 83 MMHG | RESPIRATION RATE: 18 BRPM | BODY MASS INDEX: 32.99 KG/M2 | TEMPERATURE: 97.4 F | HEIGHT: 65 IN | SYSTOLIC BLOOD PRESSURE: 113 MMHG

## 2024-12-09 DIAGNOSIS — N35.12 POSTINFECTIVE URETHRAL STRICTURE IN FEMALE: Primary | ICD-10-CM

## 2024-12-09 PROCEDURE — 25010000002 MIDAZOLAM PER 1 MG: Performed by: NURSE ANESTHETIST, CERTIFIED REGISTERED

## 2024-12-09 PROCEDURE — 25010000002 GENTAMICIN PER 80 MG: Performed by: UROLOGY

## 2024-12-09 PROCEDURE — 25010000002 PROPOFOL 200 MG/20ML EMULSION: Performed by: NURSE ANESTHETIST, CERTIFIED REGISTERED

## 2024-12-09 PROCEDURE — 52281 CYSTOSCOPY AND TREATMENT: CPT | Performed by: UROLOGY

## 2024-12-09 PROCEDURE — 25010000002 LIDOCAINE PF 2% 2 % SOLUTION: Performed by: NURSE ANESTHETIST, CERTIFIED REGISTERED

## 2024-12-09 PROCEDURE — 25010000002 FENTANYL CITRATE (PF) 50 MCG/ML SOLUTION: Performed by: NURSE ANESTHETIST, CERTIFIED REGISTERED

## 2024-12-09 RX ORDER — SODIUM CHLORIDE 9 MG/ML
40 INJECTION, SOLUTION INTRAVENOUS AS NEEDED
Status: DISCONTINUED | OUTPATIENT
Start: 2024-12-09 | End: 2024-12-09 | Stop reason: HOSPADM

## 2024-12-09 RX ORDER — IPRATROPIUM BROMIDE AND ALBUTEROL SULFATE 2.5; .5 MG/3ML; MG/3ML
3 SOLUTION RESPIRATORY (INHALATION) ONCE AS NEEDED
Status: DISCONTINUED | OUTPATIENT
Start: 2024-12-09 | End: 2024-12-09 | Stop reason: HOSPADM

## 2024-12-09 RX ORDER — FENTANYL CITRATE 50 UG/ML
INJECTION, SOLUTION INTRAMUSCULAR; INTRAVENOUS AS NEEDED
Status: DISCONTINUED | OUTPATIENT
Start: 2024-12-09 | End: 2024-12-09 | Stop reason: SURG

## 2024-12-09 RX ORDER — OXYCODONE AND ACETAMINOPHEN 5; 325 MG/1; MG/1
1 TABLET ORAL ONCE AS NEEDED
Status: DISCONTINUED | OUTPATIENT
Start: 2024-12-09 | End: 2024-12-09 | Stop reason: HOSPADM

## 2024-12-09 RX ORDER — MEPERIDINE HYDROCHLORIDE 25 MG/ML
12.5 INJECTION INTRAMUSCULAR; INTRAVENOUS; SUBCUTANEOUS
Status: DISCONTINUED | OUTPATIENT
Start: 2024-12-09 | End: 2024-12-09 | Stop reason: HOSPADM

## 2024-12-09 RX ORDER — MIDAZOLAM HYDROCHLORIDE 1 MG/ML
1 INJECTION, SOLUTION INTRAMUSCULAR; INTRAVENOUS
Status: DISCONTINUED | OUTPATIENT
Start: 2024-12-09 | End: 2024-12-09 | Stop reason: HOSPADM

## 2024-12-09 RX ORDER — HYDROCODONE BITARTRATE AND ACETAMINOPHEN 10; 325 MG/1; MG/1
1 TABLET ORAL EVERY 6 HOURS PRN
Qty: 10 TABLET | Refills: 0 | Status: SHIPPED | OUTPATIENT
Start: 2024-12-09

## 2024-12-09 RX ORDER — PROPOFOL 10 MG/ML
INJECTION, EMULSION INTRAVENOUS AS NEEDED
Status: DISCONTINUED | OUTPATIENT
Start: 2024-12-09 | End: 2024-12-09 | Stop reason: SURG

## 2024-12-09 RX ORDER — MIDAZOLAM HYDROCHLORIDE 1 MG/ML
INJECTION, SOLUTION INTRAMUSCULAR; INTRAVENOUS AS NEEDED
Status: DISCONTINUED | OUTPATIENT
Start: 2024-12-09 | End: 2024-12-09 | Stop reason: SURG

## 2024-12-09 RX ORDER — LIDOCAINE HYDROCHLORIDE 20 MG/ML
JELLY TOPICAL AS NEEDED
Status: DISCONTINUED | OUTPATIENT
Start: 2024-12-09 | End: 2024-12-09 | Stop reason: HOSPADM

## 2024-12-09 RX ORDER — SODIUM CHLORIDE 0.9 % (FLUSH) 0.9 %
10 SYRINGE (ML) INJECTION AS NEEDED
Status: DISCONTINUED | OUTPATIENT
Start: 2024-12-09 | End: 2024-12-09 | Stop reason: HOSPADM

## 2024-12-09 RX ORDER — SODIUM CHLORIDE, SODIUM LACTATE, POTASSIUM CHLORIDE, CALCIUM CHLORIDE 600; 310; 30; 20 MG/100ML; MG/100ML; MG/100ML; MG/100ML
125 INJECTION, SOLUTION INTRAVENOUS ONCE
Status: DISCONTINUED | OUTPATIENT
Start: 2024-12-09 | End: 2024-12-09 | Stop reason: HOSPADM

## 2024-12-09 RX ORDER — ONDANSETRON 2 MG/ML
4 INJECTION INTRAMUSCULAR; INTRAVENOUS AS NEEDED
Status: DISCONTINUED | OUTPATIENT
Start: 2024-12-09 | End: 2024-12-09 | Stop reason: HOSPADM

## 2024-12-09 RX ORDER — SODIUM CHLORIDE 0.9 % (FLUSH) 0.9 %
10 SYRINGE (ML) INJECTION EVERY 12 HOURS SCHEDULED
Status: DISCONTINUED | OUTPATIENT
Start: 2024-12-09 | End: 2024-12-09 | Stop reason: HOSPADM

## 2024-12-09 RX ORDER — GENTAMICIN SULFATE 80 MG/100ML
80 INJECTION, SOLUTION INTRAVENOUS
Status: COMPLETED | OUTPATIENT
Start: 2024-12-09 | End: 2024-12-09

## 2024-12-09 RX ORDER — LIDOCAINE HYDROCHLORIDE 20 MG/ML
INJECTION, SOLUTION EPIDURAL; INFILTRATION; INTRACAUDAL; PERINEURAL AS NEEDED
Status: DISCONTINUED | OUTPATIENT
Start: 2024-12-09 | End: 2024-12-09 | Stop reason: SURG

## 2024-12-09 RX ORDER — FENTANYL CITRATE 50 UG/ML
50 INJECTION, SOLUTION INTRAMUSCULAR; INTRAVENOUS
Status: DISCONTINUED | OUTPATIENT
Start: 2024-12-09 | End: 2024-12-09 | Stop reason: HOSPADM

## 2024-12-09 RX ADMIN — MIDAZOLAM HYDROCHLORIDE 2 MG: 1 INJECTION, SOLUTION INTRAMUSCULAR; INTRAVENOUS at 07:58

## 2024-12-09 RX ADMIN — PROPOFOL 50 MG: 10 INJECTION, EMULSION INTRAVENOUS at 08:06

## 2024-12-09 RX ADMIN — PROPOFOL 100 MG: 10 INJECTION, EMULSION INTRAVENOUS at 08:01

## 2024-12-09 RX ADMIN — LIDOCAINE HYDROCHLORIDE 100 MG: 20 INJECTION, SOLUTION EPIDURAL; INFILTRATION; INTRACAUDAL; PERINEURAL at 08:01

## 2024-12-09 RX ADMIN — PROPOFOL 50 MG: 10 INJECTION, EMULSION INTRAVENOUS at 08:03

## 2024-12-09 RX ADMIN — GENTAMICIN SULFATE 80 MG: 80 INJECTION, SOLUTION INTRAVENOUS at 07:58

## 2024-12-09 RX ADMIN — FENTANYL CITRATE 100 MCG: 50 INJECTION INTRAMUSCULAR; INTRAVENOUS at 07:58

## 2024-12-09 NOTE — ANESTHESIA PREPROCEDURE EVALUATION
Anesthesia Evaluation     Patient summary reviewed and Nursing notes reviewed   no history of anesthetic complications:   NPO Solid Status: > 8 hours  NPO Liquid Status: > 8 hours           Airway   Mallampati: I  TM distance: >3 FB  Neck ROM: full  No difficulty expected  Dental - normal exam     Pulmonary - negative pulmonary ROS and normal exam    breath sounds clear to auscultation  Cardiovascular - negative cardio ROS and normal exam    Patient on routine beta blocker and Beta blocker given within 24 hours of surgery  Rhythm: regular  Rate: normal        Neuro/Psych  (+) psychiatric history Anxiety and Depression  GI/Hepatic/Renal/Endo    (+) renal disease- stones and CRI    Musculoskeletal (-) negative ROS    Abdominal  - normal exam   Substance History - negative use     OB/GYN negative ob/gyn ROS         Other - negative ROS                     Anesthesia Plan    ASA 2     general     intravenous induction     Anesthetic plan, risks, benefits, and alternatives have been provided, discussed and informed consent has been obtained with: patient.      CODE STATUS:

## 2024-12-09 NOTE — OP NOTE
Gabrielle Lopez  12/9/2024    Pre-op Diagnosis:   Postinfective urethral stricture in female [N35.12]    Post-op Diagnosis:     Post-Op Diagnosis Codes:     * Postinfective urethral stricture in female [N35.12]    Procedure/CPT® Codes:  39-year-old white female with difficulty voiding secondary to urethral stenosis she was unable to tolerate it in the office she now presents for anesthetic dilation following an informed consent brought the procedure suite prepped and draped in a low dorsolithotomy position she had a normal vaginal orifice.  I anesthetized the urethra with 10 cc of 2% viscous Xylocaine jelly and gently dilated from 16 Belarusian to 30 Belarusian she tolerated it well careful cystoscopy showed no damage to the bladder no stones tumors foreign bodies she tolerated it well I will have her follow-up with Willard Solomon in 8 weeks    Procedure(s):  URETHRAL DILATATION  Cystoscopy  Surgeon(s):  Santiago Rubio MD    Anesthesia: see anesthesia record    Staff:   Circulator: Lucita Prajapati RN; Natalie Londono RN  Scrub Person: Chrissie Lucas LPN; Yessica López    Estimated Blood Loss: none  Urine Voided: * No values recorded between 12/9/2024  8:00 AM and 12/9/2024  8:14 AM *    Specimens:                None      Drains: None    Findings: Urethral stenosis    Blood: N/A    Complications: None    Grafts and Implants: None    Santiago Rubio MD     Date: 12/9/2024  Time: 08:19 EST

## 2024-12-09 NOTE — ANESTHESIA POSTPROCEDURE EVALUATION
Patient: Gabrielle LARKIN Lopez    Procedure Summary       Date: 12/09/24 Room / Location: [facility] OR 06 /  [facility] OR    Anesthesia Start: 0758 Anesthesia Stop: 0814    Procedure: URETHRAL DILATATION (Urethra) Diagnosis:       Postinfective urethral stricture in female      (Postinfective urethral stricture in female [N35.12])    Surgeons: Santiago Rubio MD Provider: Sanchez Whipple MD    Anesthesia Type: general ASA Status: 2            Anesthesia Type: general    Vitals  Vitals Value Taken Time   /67 12/09/24 0830   Temp 97.1 °F (36.2 °C) 12/09/24 0815   Pulse 74 12/09/24 0830   Resp 18 12/09/24 0830   SpO2 98 % 12/09/24 0830           Post Anesthesia Care and Evaluation    Patient location during evaluation: PACU  Patient participation: complete - patient participated  Level of consciousness: awake  Pain score: 2  Pain management: adequate    Airway patency: patent  Anesthetic complications: No anesthetic complications  PONV Status: controlled  Cardiovascular status: acceptable and blood pressure returned to baseline  Respiratory status: acceptable and room air  Hydration status: acceptable    Comments: Patient comfortable with discharge at this time.

## 2025-02-03 ENCOUNTER — TELEPHONE (OUTPATIENT)
Dept: UROLOGY | Facility: CLINIC | Age: 40
End: 2025-02-03

## 2025-02-03 ENCOUNTER — OFFICE VISIT (OUTPATIENT)
Dept: UROLOGY | Facility: CLINIC | Age: 40
End: 2025-02-03
Payer: COMMERCIAL

## 2025-02-03 ENCOUNTER — HOSPITAL ENCOUNTER (OUTPATIENT)
Dept: GENERAL RADIOLOGY | Facility: HOSPITAL | Age: 40
Discharge: HOME | End: 2025-02-03
Payer: COMMERCIAL

## 2025-02-03 VITALS
HEART RATE: 83 BPM | BODY MASS INDEX: 33.62 KG/M2 | SYSTOLIC BLOOD PRESSURE: 111 MMHG | HEIGHT: 65 IN | DIASTOLIC BLOOD PRESSURE: 75 MMHG | WEIGHT: 201.8 LBS

## 2025-02-03 DIAGNOSIS — N20.0 RIGHT NEPHROLITHIASIS: Primary | ICD-10-CM

## 2025-02-03 DIAGNOSIS — K58.1 IRRITABLE BOWEL SYNDROME WITH CONSTIPATION: ICD-10-CM

## 2025-02-03 DIAGNOSIS — N35.12 POSTINFECTIVE URETHRAL STRICTURE IN FEMALE: ICD-10-CM

## 2025-02-03 DIAGNOSIS — N32.81 OVERACTIVE BLADDER: ICD-10-CM

## 2025-02-03 PROCEDURE — 74018 RADEX ABDOMEN 1 VIEW: CPT

## 2025-02-03 PROCEDURE — 74018 RADEX ABDOMEN 1 VIEW: CPT | Performed by: RADIOLOGY

## 2025-02-03 PROCEDURE — 99214 OFFICE O/P EST MOD 30 MIN: CPT

## 2025-02-03 NOTE — PROGRESS NOTES
"Chief Complaint:    Chief Complaint   Patient presents with    Post-op Follow-up       Vital Signs:   /75 (BP Location: Right arm, Patient Position: Sitting, Cuff Size: Adult)   Pulse 83   Ht 165.1 cm (65\")   Wt 91.5 kg (201 lb 12.8 oz)   BMI 33.58 kg/m²   Body mass index is 33.58 kg/m².      HPI:  Gabrielle Lopez is a 39 y.o. female who presents today for follow up    History of Present Illness  Ms. Lopez returns to the clinic today for a postop follow-up.  She underwent urethral dilation with cystoscopy in the OR by Dr. Rubio roughly 2 months ago.  She was gently dilated to a 30 French and cystoscopy revealed no abnormalities of the bladder.  Patient reports that since cystoscopy she has been urinating better.  She states that the spraying of her urinary stream has significantly decreased.  She does endorse some pushing to urinate at times mainly in the evening.  She is significantly constipated and has a past medical history of IBS with constipation.  She has been on several medications including Linzess and Ibsrella with minimal improvement.  She is currently on Motegrity, stool softeners, and MiraLAX as needed.  She does endorse improvement with Myrbetriq.  States she still has some frequency but denies any dysuria.  She does have some intermittent urge incontinence but is doing better.  Post-op Follow-up    Past Medical History:  Past Medical History:   Diagnosis Date    Anemia     Chronic kidney disease     Hypoglycemia     Irritable bowel     Kidney stone     Tachycardia     Urinary incontinence     at times    Urinary tract infection 2022       Current Meds:  Current Outpatient Medications   Medication Sig Dispense Refill    aspirin 81 MG oral suspension       atomoxetine (STRATTERA) 40 MG capsule Take 1 capsule by mouth Every Morning.      baclofen (LIORESAL) 10 MG tablet Take 1 tablet by mouth 3 (Three) Times a Day.      buPROPion XL (WELLBUTRIN XL) 300 MG 24 hr tablet Take 1 tablet by mouth " Daily.      dicyclomine (BENTYL) 10 MG capsule Take 1 capsule by mouth As Needed.      Emgality 120 MG/ML auto-injector pen Inject 1 mL under the skin into the appropriate area as directed.      famotidine (PEPCID) 40 MG tablet Take 1 tablet by mouth Daily.      levocetirizine (XYZAL) 5 MG tablet Take 1 tablet by mouth Every Evening.      Magnesium 500 MG tablet       Melatonin 10 MG capsule       metoprolol tartrate (LOPRESSOR) 25 MG tablet Take 1 tablet by mouth 2 (Two) Times a Day.      metroNIDAZOLE (METROGEL) 1 % gel       Mirabegron ER (Myrbetriq) 50 MG tablet sustained-release 24 hour 24 hr tablet Take 50 mg by mouth Daily. 90 tablet 3    montelukast (SINGULAIR) 10 MG tablet Take 1 tablet by mouth Every Night.      Motegrity 2 MG tablet Take 1 tablet by mouth Daily.      propranolol (INDERAL) 40 MG tablet Take 1 tablet by mouth 2 (Two) Times a Day.      Triamcinolone Acetonide (Nasacort Allergy 24HR) 55 MCG/ACT nasal inhaler       trospium (SANCTURA) 20 MG tablet Take 1 tablet by mouth 2 (Two) Times a Day.      Ubrelvy 100 MG tablet Take 1 tablet by mouth.      HYDROcodone-acetaminophen (NORCO)  MG per tablet Take 1 tablet by mouth Every 6 (Six) Hours As Needed for Moderate Pain. (Patient not taking: Reported on 2/3/2025) 10 tablet 0    Plecanatide 3 MG tablet Take 1 tablet by mouth Every Morning. 30 tablet 2     No current facility-administered medications for this visit.        Allergies:   Allergies   Allergen Reactions    Peanut Oil Nausea And Vomiting    Shellfish-Derived Products Hives    Wheat Nausea And Vomiting        Past Surgical History:  Past Surgical History:   Procedure Laterality Date     SECTION      CHOLECYSTECTOMY      COLONOSCOPY      ENDOSCOPY      EXTERNAL EAR SURGERY      closed ear ducts - both ears    HYSTERECTOMY      KIDNEY STONE SURGERY  21    LAPAROSCOPY DIAGNOSTIC / BIOPSY / ASPIRATION / LYSIS      LITHOTRIPSY  21    URETHRAL DILATATION N/A 2024     Procedure: URETHRAL DILATATION;  Surgeon: Santiago Rubio MD;  Location: [facility];  Service: Urology;  Laterality: N/A;       Social History:  Social History     Socioeconomic History    Marital status:    Tobacco Use    Smoking status: Never     Passive exposure: Never    Smokeless tobacco: Never   Vaping Use    Vaping status: Never Used   Substance and Sexual Activity    Alcohol use: No    Drug use: Never    Sexual activity: Defer     Partners: Male     Birth control/protection: Hysterectomy       Family History:  Family History   Problem Relation Age of Onset    Breast cancer Neg Hx        Review of Systems:  Review of Systems   Constitutional:  Negative for fatigue, fever and unexpected weight change.   Respiratory:  Negative for chest tightness and shortness of breath.    Cardiovascular:  Negative for chest pain.   Gastrointestinal:  Positive for abdominal pain and nausea. Negative for constipation, diarrhea and vomiting.   Genitourinary:  Positive for frequency and urgency. Negative for difficulty urinating and dysuria.   Musculoskeletal:  Positive for back pain.   Skin:  Negative for rash.   Psychiatric/Behavioral:  Negative for confusion and suicidal ideas.        Physical Exam:  Physical Exam  Constitutional:       General: She is not in acute distress.     Appearance: Normal appearance.   HENT:      Head: Normocephalic and atraumatic.      Nose: Nose normal.      Mouth/Throat:      Mouth: Mucous membranes are moist.   Eyes:      Conjunctiva/sclera: Conjunctivae normal.   Cardiovascular:      Rate and Rhythm: Normal rate and regular rhythm.      Pulses: Normal pulses.      Heart sounds: Normal heart sounds.   Pulmonary:      Effort: Pulmonary effort is normal.      Breath sounds: Normal breath sounds.   Abdominal:      General: Bowel sounds are normal.      Palpations: Abdomen is soft.   Musculoskeletal:         General: Normal range of motion.      Cervical back: Normal range of motion.    Skin:     General: Skin is warm.   Neurological:      General: No focal deficit present.      Mental Status: She is alert and oriented to person, place, and time.   Psychiatric:         Mood and Affect: Mood normal.         Behavior: Behavior normal.         Thought Content: Thought content normal.         Judgment: Judgment normal.     I      Recent Image (CT and/or KUB):   CT Abdomen and Pelvis: No results found for this or any previous visit.     CT Stone Protocol: No results found for this or any previous visit.     KUB: Results for orders placed in visit on 05/31/24    XR abdomen kub    Narrative  PROCEDURE: XR ABDOMEN KUB-    HISTORY: right nephrolithasis; N20.0-Calculus of kidney    COMPARISON: None.    FINDINGS: An AP view of the abdomen and pelvis demonstrates an  unremarkable bowel gas pattern with no evidence of obstruction. There is  a 4 mm calcification overlying the lower right renal shadow. The left  renal shadow is obscured by bowel gas. Suspected phleboliths are present  in the pelvis.    Impression  Findings consistent with right nephrolithiasis.          This report was finalized on 5/31/2024 4:00 PM by Sandra Boyd M.D..       Labs:  Brief Urine Lab Results  (Last result in the past 365 days)        Color   Clarity   Blood   Leuk Est   Nitrite   Protein   CREAT   Urine HCG        05/31/24 1458 Yellow   Clear   Negative   Negative   Negative   Negative                 Pre-Admission Testing on 12/06/2024   Component Date Value Ref Range Status    Glucose 12/06/2024 105 (H)  65 - 99 mg/dL Final    BUN 12/06/2024 18  6 - 20 mg/dL Final    Creatinine 12/06/2024 0.80  0.57 - 1.00 mg/dL Final    Sodium 12/06/2024 143  136 - 145 mmol/L Final    Potassium 12/06/2024 4.4  3.5 - 5.2 mmol/L Final    Chloride 12/06/2024 104  98 - 107 mmol/L Final    CO2 12/06/2024 26.2  22.0 - 29.0 mmol/L Final    Calcium 12/06/2024 9.0  8.6 - 10.5 mg/dL Final    BUN/Creatinine Ratio 12/06/2024 22.5  7.0 - 25.0 Final     Anion Gap 12/06/2024 12.8  5.0 - 15.0 mmol/L Final    eGFR 12/06/2024 95.1  >60.0 mL/min/1.73 Final    WBC 12/06/2024 7.32  3.40 - 10.80 10*3/mm3 Final    RBC 12/06/2024 4.92  3.77 - 5.28 10*6/mm3 Final    Hemoglobin 12/06/2024 13.7  12.0 - 15.9 g/dL Final    Hematocrit 12/06/2024 43.3  34.0 - 46.6 % Final    MCV 12/06/2024 88.0  79.0 - 97.0 fL Final    MCH 12/06/2024 27.8  26.6 - 33.0 pg Final    MCHC 12/06/2024 31.6  31.5 - 35.7 g/dL Final    RDW 12/06/2024 12.6  12.3 - 15.4 % Final    RDW-SD 12/06/2024 41.1  37.0 - 54.0 fl Final    MPV 12/06/2024 9.0  6.0 - 12.0 fL Final    Platelets 12/06/2024 282  140 - 450 10*3/mm3 Final        Procedure: None  Procedures     I have reviewed and agree with the above PMH, PSH, FMH, social history, medications, allergies, and labs.     Assessment/Plan:   Problem List Items Addressed This Visit       Right nephrolithiasis - Primary    Relevant Orders    XR abdomen kub (Completed)    Overactive bladder    Postinfective urethral stricture in female     Other Visit Diagnoses       Irritable bowel syndrome with constipation        Relevant Medications    Plecanatide 3 MG tablet    Other Relevant Orders    XR abdomen kub (Completed)            Health Maintenance:   Health Maintenance Due   Topic Date Due    BMI FOLLOWUP  Never done    HEPATITIS C SCREENING  Never done    ANNUAL PHYSICAL  Never done    PAP SMEAR  Never done    INFLUENZA VACCINE  Never done    COVID-19 Vaccine (1 - 2024-25 season) Never done        Smoking Counseling: Never smoked.  Never used smokeless tobacco.    Urine Incontinence: Patient reports that she is not currently experiencing any symptoms of urinary incontinence.    Patient was given instructions and counseling regarding her condition or for health maintenance advice. Please see specific information pulled into the AVS if appropriate.    Patient Education:   IBS with constipation -discussed with the patient the pathophysiology of this condition in  detail.  Did discuss the use of Trulance for IBS constipation.  Did advise the risk of this medication in detail with the patient.  Patient wishes to try medication at this time and we will send in a prescription to her local pharmacy.  Vies her to follow-up with her gastroenterologist Dr. Sylvester.  She verbalized understanding.  Right nephrolithiasis -patient is having some intermittent right back and flank pain with a history of kidney stones will repeat a KUB in office today.  Did discuss the use of surgical intervention such as extracorporal shockwave lithotripsy versus ureteroscopy with home laser lithotripsy.  I will call her with x-ray results once available.  Overactive bladder/decreased capacity -patient is doing well on Myrbetriq 50 mg once daily will continue with medications at this time.  Post infective urethral stricture -patient is 8 weeks status post cystoscopy with urethral dilation by Dr. Rubio and is urinating better.  Will continue with observation and place her back in 6 months or sooner if needed.  She verbalized understanding.    Visit Diagnoses:    ICD-10-CM ICD-9-CM   1. Right nephrolithiasis  N20.0 592.0   2. Irritable bowel syndrome with constipation  K58.1 564.1   3. Postinfective urethral stricture in female  N35.12 598.00   4. Overactive bladder  N32.81 596.51     A total of 30 minutes were spent coordinating this patient’s care in clinic today; 20 minutes of which were face-to-face with the patient, reviewing medical history and counseling on the current treatment and followup plan.  All questions were answered to patient's satisfaction.    Meds Ordered During Visit:  New Medications Ordered This Visit   Medications    Plecanatide 3 MG tablet     Sig: Take 1 tablet by mouth Every Morning.     Dispense:  30 tablet     Refill:  2       Follow Up Appointment: 6  No follow-ups on file.      This document has been electronically signed by Jono Solomon PA-C   February 3, 2025 10:42  EST    Part of this note may be an electronic transcription/translation of spoken language to printed text using the Dragon Dictation System.

## 2025-02-03 NOTE — TELEPHONE ENCOUNTER
I called the pt reviewing her X-ray with her. The pt verbalized understanding.                             ----- Message from Jono Solomon sent at 2/3/2025 10:57 AM EST -----  Please let patient know that x-ray showed no significant change in her right renal stone.  She did have a moderate stool burden and recommend to continue with medications to help with her constipation.  Thank you.  ----- Message -----  From: Interface, Rad Results [place] In  Sent: 2/3/2025  10:29 AM EST  To: Jono Solomon PA-C

## 2025-02-05 ENCOUNTER — TELEPHONE (OUTPATIENT)
Dept: UROLOGY | Facility: CLINIC | Age: 40
End: 2025-02-05
Payer: COMMERCIAL

## 2025-02-05 NOTE — TELEPHONE ENCOUNTER
PA for Trulance has been sent to pt's insurance company, currently waiting on a response back.                    Sent to Plan today  Drug  Trulance 3MG tablets  ePA cloud logo  Form  [facility] Electronic PA Form (2017 N[employer])  Original Claim Info  70,MR   spontaneous/unlabored

## 2025-02-13 ENCOUNTER — HOSPITAL ENCOUNTER (OUTPATIENT)
Dept: MAMMOGRAPHY | Facility: HOSPITAL | Age: 40
Discharge: HOME | End: 2025-02-13
Admitting: PHYSICIAN ASSISTANT
Payer: COMMERCIAL

## 2025-02-13 DIAGNOSIS — Z12.31 VISIT FOR SCREENING MAMMOGRAM: ICD-10-CM

## 2025-02-13 PROCEDURE — 77067 SCR MAMMO BI INCL CAD: CPT

## 2025-02-13 PROCEDURE — 77067 SCR MAMMO BI INCL CAD: CPT | Performed by: RADIOLOGY

## 2025-02-13 PROCEDURE — 77063 BREAST TOMOSYNTHESIS BI: CPT

## 2025-02-13 PROCEDURE — 77063 BREAST TOMOSYNTHESIS BI: CPT | Performed by: RADIOLOGY

## 2025-03-24 ENCOUNTER — TELEPHONE (OUTPATIENT)
Dept: UROLOGY | Facility: CLINIC | Age: 40
End: 2025-03-24

## 2025-04-14 ENCOUNTER — TELEPHONE (OUTPATIENT)
Dept: UROLOGY | Facility: CLINIC | Age: 40
End: 2025-04-14
Payer: COMMERCIAL

## 2025-04-14 DIAGNOSIS — N32.81 OVERACTIVE BLADDER: ICD-10-CM

## 2025-04-14 RX ORDER — MIRABEGRON 50 MG/1
50 TABLET, FILM COATED, EXTENDED RELEASE ORAL DAILY
Qty: 90 TABLET | Refills: 3 | Status: SHIPPED | OUTPATIENT
Start: 2025-04-14

## 2025-04-14 NOTE — TELEPHONE ENCOUNTER
Patient called requesting refills on her Myrbetriq. She has been taking it since August but thought she was supposed to take is twice a day so she is out of refills. She uses [facility] pharmacy in [place] please advise.

## 2025-06-07 DIAGNOSIS — K58.1 IRRITABLE BOWEL SYNDROME WITH CONSTIPATION: ICD-10-CM

## 2025-06-09 RX ORDER — PLECANATIDE 3 MG/1
1 TABLET ORAL EVERY MORNING
Qty: 30 TABLET | Refills: 0 | Status: SHIPPED | OUTPATIENT
Start: 2025-06-09

## 2025-07-06 DIAGNOSIS — K58.1 IRRITABLE BOWEL SYNDROME WITH CONSTIPATION: ICD-10-CM

## 2025-07-07 RX ORDER — PLECANATIDE 3 MG/1
1 TABLET ORAL EVERY MORNING
Qty: 30 TABLET | Refills: 0 | Status: SHIPPED | OUTPATIENT
Start: 2025-07-07

## 2025-08-01 DIAGNOSIS — K58.1 IRRITABLE BOWEL SYNDROME WITH CONSTIPATION: ICD-10-CM

## 2025-08-01 RX ORDER — PLECANATIDE 3 MG/1
1 TABLET ORAL EVERY MORNING
Qty: 30 TABLET | Refills: 0 | Status: SHIPPED | OUTPATIENT
Start: 2025-08-01

## (undated) DEVICE — COR CYSTO: Brand: MEDLINE INDUSTRIES, INC.

## (undated) DEVICE — GLV SURG PREMIERPRO MIC LTX PF SZ8 BRN